# Patient Record
Sex: MALE | Race: ASIAN | Employment: FULL TIME | URBAN - METROPOLITAN AREA
[De-identification: names, ages, dates, MRNs, and addresses within clinical notes are randomized per-mention and may not be internally consistent; named-entity substitution may affect disease eponyms.]

---

## 2018-02-05 ENCOUNTER — TRANSFERRED RECORDS (OUTPATIENT)
Dept: HEALTH INFORMATION MANAGEMENT | Facility: CLINIC | Age: 43
End: 2018-02-05

## 2018-02-05 ENCOUNTER — APPOINTMENT (OUTPATIENT)
Dept: GENERAL RADIOLOGY | Facility: CLINIC | Age: 43
End: 2018-02-05
Attending: EMERGENCY MEDICINE
Payer: COMMERCIAL

## 2018-02-05 ENCOUNTER — HOSPITAL ENCOUNTER (EMERGENCY)
Facility: CLINIC | Age: 43
Discharge: HOME | End: 2018-02-05
Attending: EMERGENCY MEDICINE | Admitting: EMERGENCY MEDICINE
Payer: COMMERCIAL

## 2018-02-05 VITALS
OXYGEN SATURATION: 100 % | TEMPERATURE: 98.9 F | DIASTOLIC BLOOD PRESSURE: 96 MMHG | SYSTOLIC BLOOD PRESSURE: 118 MMHG | HEART RATE: 108 BPM | RESPIRATION RATE: 20 BRPM

## 2018-02-05 DIAGNOSIS — R07.9 CHEST PAIN, UNSPECIFIED TYPE: ICD-10-CM

## 2018-02-05 LAB
ALBUMIN SERPL-MCNC: 3.9 G/DL (ref 3.4–5)
ALP SERPL-CCNC: 123 U/L (ref 40–150)
ALT SERPL W P-5'-P-CCNC: 104 U/L (ref 0–70)
ANION GAP SERPL CALCULATED.3IONS-SCNC: 8 MMOL/L (ref 3–14)
AST SERPL W P-5'-P-CCNC: 53 U/L (ref 0–45)
BASOPHILS # BLD AUTO: 0 10E9/L (ref 0–0.2)
BASOPHILS NFR BLD AUTO: 0.2 %
BILIRUB DIRECT SERPL-MCNC: 0.2 MG/DL (ref 0–0.2)
BILIRUB SERPL-MCNC: 1 MG/DL (ref 0.2–1.3)
BUN SERPL-MCNC: 17 MG/DL (ref 7–30)
CALCIUM SERPL-MCNC: 8.7 MG/DL (ref 8.5–10.1)
CHLORIDE SERPL-SCNC: 102 MMOL/L (ref 94–109)
CO2 SERPL-SCNC: 24 MMOL/L (ref 20–32)
CREAT SERPL-MCNC: 0.77 MG/DL (ref 0.66–1.25)
D DIMER PPP FEU-MCNC: 0.4 UG/ML FEU (ref 0–0.5)
DIFFERENTIAL METHOD BLD: ABNORMAL
EOSINOPHIL # BLD AUTO: 1.1 10E9/L (ref 0–0.7)
EOSINOPHIL NFR BLD AUTO: 8.9 %
ERYTHROCYTE [DISTWIDTH] IN BLOOD BY AUTOMATED COUNT: 13.1 % (ref 10–15)
GFR SERPL CREATININE-BSD FRML MDRD: >90 ML/MIN/1.7M2
GLUCOSE SERPL-MCNC: 129 MG/DL (ref 70–99)
HCT VFR BLD AUTO: 45 % (ref 40–53)
HGB BLD-MCNC: 14.3 G/DL (ref 13.3–17.7)
IMM GRANULOCYTES # BLD: 0 10E9/L (ref 0–0.4)
IMM GRANULOCYTES NFR BLD: 0.3 %
INTERPRETATION ECG - MUSE: NORMAL
LIPASE SERPL-CCNC: 119 U/L (ref 73–393)
LYMPHOCYTES # BLD AUTO: 1.9 10E9/L (ref 0.8–5.3)
LYMPHOCYTES NFR BLD AUTO: 14.7 %
MCH RBC QN AUTO: 24.8 PG (ref 26.5–33)
MCHC RBC AUTO-ENTMCNC: 31.8 G/DL (ref 31.5–36.5)
MCV RBC AUTO: 78 FL (ref 78–100)
MONOCYTES # BLD AUTO: 0.8 10E9/L (ref 0–1.3)
MONOCYTES NFR BLD AUTO: 6.2 %
NEUTROPHILS # BLD AUTO: 8.8 10E9/L (ref 1.6–8.3)
NEUTROPHILS NFR BLD AUTO: 69.7 %
NRBC # BLD AUTO: 0 10*3/UL
NRBC BLD AUTO-RTO: 0 /100
PLATELET # BLD AUTO: 243 10E9/L (ref 150–450)
POTASSIUM SERPL-SCNC: 4.1 MMOL/L (ref 3.4–5.3)
PROT SERPL-MCNC: 8.7 G/DL (ref 6.8–8.8)
RBC # BLD AUTO: 5.77 10E12/L (ref 4.4–5.9)
SODIUM SERPL-SCNC: 134 MMOL/L (ref 133–144)
TROPONIN I SERPL-MCNC: <0.015 UG/L (ref 0–0.04)
WBC # BLD AUTO: 12.6 10E9/L (ref 4–11)

## 2018-02-05 PROCEDURE — 71046 X-RAY EXAM CHEST 2 VIEWS: CPT

## 2018-02-05 PROCEDURE — 80048 BASIC METABOLIC PNL TOTAL CA: CPT | Performed by: EMERGENCY MEDICINE

## 2018-02-05 PROCEDURE — 83690 ASSAY OF LIPASE: CPT | Performed by: EMERGENCY MEDICINE

## 2018-02-05 PROCEDURE — 84484 ASSAY OF TROPONIN QUANT: CPT | Performed by: EMERGENCY MEDICINE

## 2018-02-05 PROCEDURE — 80076 HEPATIC FUNCTION PANEL: CPT | Performed by: EMERGENCY MEDICINE

## 2018-02-05 PROCEDURE — 99285 EMERGENCY DEPT VISIT HI MDM: CPT | Mod: 25

## 2018-02-05 PROCEDURE — 85379 FIBRIN DEGRADATION QUANT: CPT | Performed by: EMERGENCY MEDICINE

## 2018-02-05 PROCEDURE — 93005 ELECTROCARDIOGRAM TRACING: CPT

## 2018-02-05 PROCEDURE — 25000125 ZZHC RX 250: Performed by: EMERGENCY MEDICINE

## 2018-02-05 PROCEDURE — 85025 COMPLETE CBC W/AUTO DIFF WBC: CPT | Performed by: EMERGENCY MEDICINE

## 2018-02-05 PROCEDURE — 25000132 ZZH RX MED GY IP 250 OP 250 PS 637: Performed by: EMERGENCY MEDICINE

## 2018-02-05 RX ADMIN — LIDOCAINE HYDROCHLORIDE 30 ML: 20 SOLUTION ORAL; TOPICAL at 17:54

## 2018-02-05 ASSESSMENT — ENCOUNTER SYMPTOMS
NAUSEA: 1
PALPITATIONS: 1
BACK PAIN: 1
SHORTNESS OF BREATH: 1

## 2018-02-05 NOTE — LETTER
February 5, 2018      To Whom It May Concern:      Vanesa Harmon was seen in our Emergency Department today, 02/05/18.  I expect his condition to improve over the next 1 days.  He may return to work/school when improved.    Sincerely,        Eladio Caro RN

## 2018-02-05 NOTE — ED PROVIDER NOTES
History     Chief Complaint:  Chest Pain    HPI   Vanesa Harmon is a 42 year old male who presents to the emergency department today for evaluation of chest pain and is accompanied by his wife and son.  The patient reports intermittent left and right sided chest pain, sometimes radiating into the back, that has been occurring everyday for several months.  Pain typically lasts for 2-3 hours at a time, seems to worsen with bending and lifting, and occasionally feels as if it is burning.  Today, he reports the onset of this pain at 0400 which is at a 3/10 here and has been constant since that time. He was seen by his PMD, who sent him here.  He also reports nausea here, some shortness of breath, as well as occasional fluttering palpitations over the past several days. Denies history of CAD, hypertension, MI, DVT/PE, or recent surgeries in the past few weeks.  Of note, the patient quit smoking 5 years ago, but began smoking lightly over the past 4 months.  He notes occasional brown mucous when waking up in the morning.    Cardiac Risk Factors:  CAD:                 -  Hypertension :   -  Hyperlipidemia:  +  Diabetes:       +  Tobacco use:     +    Gender:      Male  Age:        42  Familial Hx of CAD:  -    PE/DVT Risk Factors:   Hx of PE/DVT:   -  Hx of clotting disorder:  -  Hx of cancer:    -  Tobacco use:    +  Hormone therapy:   -  Pregnancy:    -  Prolonged immobilization:  -  Recent surgery:   -  Recent travel:    -  Familial Hx of PE/DVT:  -    Allergies:  No Known Drug Allergies    Medications:    Metformin  Atorvastatin  Omeprazole    Past Medical History:    Type 2 diabetes  Hypercholesteremia  Asthma    Past Surgical History:    History reviewed. No pertinent past surgical history.    Family History:    Leukemia-mother, brother  Diabetes-brother  Hypertension-father    Social History:  The patient was accompanied to the ED by his wife and son.  Smoking Status: Light smoker  Smokeless Tobacco: Never  used  Alcohol Use: Yes  Marital Status:   [2]    Review of Systems   Respiratory: Positive for shortness of breath.    Cardiovascular: Positive for chest pain and palpitations. Negative for leg swelling.   Gastrointestinal: Positive for nausea.   Musculoskeletal: Positive for back pain.   All other systems reviewed and are negative.    Physical Exam     Patient Vitals for the past 24 hrs:   BP Temp Temp src Pulse Resp SpO2   02/05/18 1348 (!) 118/96 98.9  F (37.2  C) Oral 108 20 100 %     Physical Exam  Constitutional: The patient is oriented to person, place, and time. Alert and cooperative.  HENT:   Right Ear: External ear normal.   Left Ear: External ear normal.   Nose: Nose normal.   Mouth/Throat: Uvula is midline, oropharynx is clear and moist and mucous membranes are normal. No posterior oropharyngeal edema or erythema.   Eyes: Conjunctivae, EOM and lids are normal. Pupils are equal, round, and reactive to light.   Neck: Trachea normal. Normal range of motion. Neck supple.   Cardiovascular: tachcyardia, regular rhythm, normal heart sounds, and intact distal pulses.    Pulmonary/Chest: Effort normal and breath sounds equal bilaterally. No crackles or wheezing.   Abdominal: Soft. No tenderness. No rebound and no guarding.   Musculoskeletal: Normal range of motion.  No extremity tenderness or edema.  Neurological: Alert and Oriented. Strength 5/5 in upper and lower extremities bilaterally. Sensation intact to light touch throughout.  Skin: Skin is dry. No rash noted.          Emergency Department Course     ECG:  ECG taken at 1337, ECG read at 1748  Sinus tachycardia  Otherwise normal ECG  Rate 107 bpm. IA interval 164. QRS duration 80. QT/QTc 330/440. P-R-T axes 61 28 39.    Imaging:  Radiology findings were communicated with the patient and family who voiced understanding of the findings.    Chest XR, PA & LAT  Cardiac silhouette and pulmonary vasculature are within  normal limits. No focal airspace  disease, pleural effusion or  Pneumothorax.  Reading per radiology    Laboratory:  Laboratory findings were communicated with the patient and family who voiced understanding of the findings.    CBC: WBC 12.6 (H), HGB 14.3,    BMP: Glucose 129 (H) o/w WNL (Creatinine 0.77)  Hepatic Panel:  (H), AST 53 (H) o/w WNL  Lipase: 119  Troponin (Collected 1457): <0.015  D Dimer (Collected 1457): 0.4    Interventions:  1754 GI cocktail 30 mL PO    Emergency Department Course:  Nursing notes and vitals reviewed.  1738: I performed an exam of the patient as documented above.   IV was inserted and blood was drawn for laboratory testing, results above.  The patient was sent for a chest x-ray, PA and LAT, while in the emergency department, results above.   1835: I rechecked the patient and updated him and his family on the results of laboratory and imaging studies. I discussed the treatment plan with the patient. He expressed understanding of this plan and consented to discharge. He will be discharged home with instructions for care and follow up. In addition, the patient will return to the emergency department if their symptoms worsen, if new symptoms arise or if there is any concern.  All questions were answered prior to discharge.    Impression & Plan      Medical Decision Making:  Vanesa Harmon is a 42 year old male with history of diabetes and hyperlipidemia who presents to the emergency department for evaluation of chest pain.  Upon presentation in the ED, the patient is nontoxic appearing.  He is hypertensive and mildly tachycardic, but vital signs are otherwise within normal limits and stable.  On exam, he is well-appearing.  He is alert, oriented, and neurologic exam is nonfocal.  Cardiopulmonary exam is unremarkable.  Abdomen is soft and nontender throughout.  He has no lower extremity edema.  The rest of his exam is as mentioned above.  Differential includes, but is not limited to, ACS, PE, pneumonia,  pneumothorax, aortic dissection, esophageal rupture, GERD, pericarditis, musculoskeletal chest wall pain.  EKG was obtained and demonstrates sinus rhythm.  There are no concerning acute ischemic changes.  Labs were obtained and are as mentioned above.  Notably, his LFTs are mildly elevated.  However, the patient  has had mildly elevated LFTs in the past.  He has no abdominal tenderness on exam, therefore I have low suspicion for an acute hepatic pathology or acute surgical abdomen.  I do not feel that imaging of the abdomen is indicated at this time.  I did discuss with the patient that I recommend close follow-up with his primary care physician regarding these.  Chest x-ray was obtained and demonstrates no evidence of an acute cardiopulmonary process.  Given the unremarkable chest x-ray, I have low suspicion for pneumonia, pneumothorax.  His history and presentation are not consistent with aortic dissection, esophageal rupture, or pericarditis.  Therefore, I feel these diagnoses are less likely.  The patient is low risk for PE by well's criteria and with a negative d-dimer, PE is less likely and further evaluation for this is not indicated at this time.  Given the unremarkable EKG and negative troponin despite greater than 6 hours of symptoms, this essentially rules out ACS.  The patient is low risk for ACS by heart score.  On upon my repeat evaluation, after the patient received a GI cocktail, he does note improvement of his symptoms.  Given that he is well-appearing and with an unremarkable work up, I do feel that he is safe for discharge home.  I did discuss with the patient that I recommend close follow-up with his primary care physician with an outpatient stress test.  He notes understanding and agreement with this plan.  He will be initiated on an antacid.  Return instructions were given.  He was stable/improved at time of discharge.    Diagnosis:    ICD-10-CM    1. Chest pain, unspecified type R07.9 Exercise  Stress Echocardiogram     Disposition:   Home    Discharge Medications:  Discharge Medication List as of 2/5/2018  7:07 PM      START taking these medications    Details   ranitidine (ZANTAC) 150 MG tablet Take 1 tablet (150 mg) by mouth 2 times daily for 10 days, Disp-20 tablet, R-0, Local Print           Scribe Disclosure:  I, Solis Barrow, am serving as a scribe at 5:38 PM on 2/5/2018 to document services personally performed by Avis Franklin MD, based on my observations and the provider's statements to me.    2/5/2018   [facility] EMERGENCY DEPARTMENT       Avis Franklin MD  02/07/18 1600

## 2018-02-05 NOTE — ED AVS SNAPSHOT
[facility] Emergency Department    [address]    [facility] [address]    Phone:  [phone]    Fax:  [phone]                                       Vanesa Harmon   MRN: [id number]    Department:  [facility] Emergency Department   Date of Visit:  2/5/2018           Patient Information     Date Of Birth          1975        Your diagnoses for this visit were:     Chest pain, unspecified type        You were seen by Avis Franklin MD.      Follow-up Information     Follow up with Gurjit Marie In 3 days.    Specialty:  Family Practice    Contact information:    [facility]  [address]  [facility] [address]  [phone]          Discharge Instructions       Please follow up closely with your regular physician and the outpatient stress test.     Please return to the ED if your symptoms worsen or if you develop new or concerning symptoms.     Discharge Instructions  Chest Pain    You have been seen today for chest pain or discomfort.  At this time, your doctor has found no signs that your chest pain is due to a serious or life-threatening condition, (or you have declined more testing and/or admission to the hospital). However, sometimes there is a serious problem that does not show up right away. Your evaluation today may not be complete and you may need further testing and evaluation.     You need to follow-up with your regular doctor within 3 days.    Return to the Emergency Department if:    Your chest pain changes, gets worse, starts to happen more often, or comes with less activity.    You are short of breath.    You get very weak or tired.    You pass out or faint.    You have any new symptoms, like fever, cough, numb legs, or you cough up blood.    You have anything else that worries you.    Until you follow-up with your regular doctor please do the following:    Take one aspirin daily unless you have an allergy or are told not to by your  doctor.    If a stress test appointment has been made, go to the appointment.    If you have questions, contact your regular doctor.    If your doctor today has told you to follow-up with your regular doctor, it is very important that you make an appointment with your clinic and go to the appointment.  If you do not follow-up with your primary doctor, it may result in missing an important development which could result in permanent injury or disability and/or lasting pain.  If there is any problem keeping your appointment, call your doctor or return to the Emergency Department.    If you were given a prescription for medicine here today, be sure to read all of the information (including the package insert) that comes with your prescription.  This will include important information about the medicine, its side effects, and any warnings that you need to know about.  The pharmacist who fills the prescription can provide more information and answer questions you may have about the medicine.  If you have questions or concerns that the pharmacist cannot address, please call or return to the Emergency Department.     Opioid Medication Information    Pain medications are among the most commonly prescribed medicines, so we are including this information for all our patients. If you did not receive pain medication or get a prescription for pain medicine, you can ignore it.     You may have been given a prescription for an opioid (narcotic) pain medicine and/or have received a pain medicine while here in the Emergency Department. These medicines can make you drowsy or impaired. You must not drive, operate dangerous equipment, or engage in any other dangerous activities while taking these medications. If you drive while taking these medications, you could be arrested for DUI, or driving under the influence. Do not drink any alcohol while you are taking these medications.     Opioid pain medications can cause addiction. If you  have a history of chemical dependency of any type, you are at a higher risk of becoming addicted to pain medications.  Only take these prescribed medications to treat your pain when all other options have been tried. Take it for as short a time and as few doses as possible. Store your pain pills in a secure place, as they are frequently stolen and provide a dangerous opportunity for children or visitors in your house to start abusing these powerful medications. We will not replace any lost or stolen medicine.  As soon as your pain is better, you should flush all your remaining medication.     Many prescription pain medications contain Tylenol  (acetaminophen), including Vicodin , Tylenol #3 , Norco , Lortab , and Percocet .  You should not take any extra pills of Tylenol  if you are using these prescription medications or you can get very sick.  Do not ever take more than 3000 mg of acetaminophen in any 24 hour period.    All opioids tend to cause constipation. Drink plenty of water and eat foods that have a lot of fiber, such as fruits, vegetables, prune juice, apple juice and high fiber cereal.  Take a laxative if you don t move your bowels at least every other day. Miralax , Milk of Magnesia, Colace , or Senna  can be used to keep you regular.      Remember that you can always come back to the Emergency Department if you are not able to see your regular doctor in the amount of time listed above, if you get any new symptoms, or if there is anything that worries you.          24 Hour Appointment Hotline       To make an appointment at any [facility], call [phone] ([phone]). If you don't have a family doctor or clinic, we will help you find one. [facility] clinics are conveniently located to serve the needs of you and your family.          ED Discharge Orders     Exercise Stress Echocardiogram       Administration of IV contrast will be tailored to this examination per the appropriate written  protocol listed in the Echocardiography department Protocol Book, or by the supervising Cardiologist. This may result in an order change.    Use of contrast is at the discretion of the supervising Cardiologist.                     Review of your medicines      START taking        Dose / Directions Last dose taken    ranitidine 150 MG tablet   Commonly known as:  ZANTAC   Dose:  150 mg   Quantity:  20 tablet        Take 1 tablet (150 mg) by mouth 2 times daily for 10 days   Refills:  0          Our records show that you are taking the medicines listed below. If these are incorrect, please call your family doctor or clinic.        Dose / Directions Last dose taken    NO ACTIVE MEDICATIONS        Refills:  0                Prescriptions were sent or printed at these locations (1 Prescription)                   Other Prescriptions                Printed at Department/Unit printer (1 of 1)         ranitidine (ZANTAC) 150 MG tablet                Procedures and tests performed during your visit     Basic metabolic panel    CBC with platelets differential    Chest XR,  PA & LAT    D dimer quantitative    EKG 12 lead    Hepatic panel    Lipase    Troponin I      Orders Needing Specimen Collection     None      Pending Results     No orders found from 2/3/2018 to 2/6/2018.            Pending Culture Results     No orders found from 2/3/2018 to 2/6/2018.            Pending Results Instructions     If you had any lab results that were not finalized at the time of your Discharge, you can call the ED Lab Result RN at [phone]. You will be contacted by this team for any positive Lab results or changes in treatment. The nurses are available 7 days a week from 10A to 6:30P.  You can leave a message 24 hours per day and they will return your call.        Test Results From Your Hospital Stay        2/5/2018  3:32 PM      Component Results     Component Value Ref Range & Units Status    Sodium 134 133 - 144 mmol/L Final     Potassium 4.1 3.4 - 5.3 mmol/L Final    Chloride 102 94 - 109 mmol/L Final    Carbon Dioxide 24 20 - 32 mmol/L Final    Anion Gap 8 3 - 14 mmol/L Final    Glucose 129 (H) 70 - 99 mg/dL Final    Urea Nitrogen 17 7 - 30 mg/dL Final    Creatinine 0.77 0.66 - 1.25 mg/dL Final    GFR Estimate >90 >60 mL/min/1.7m2 Final    Non  GFR Calc    GFR Estimate If Black >90 >60 mL/min/1.7m2 Final    African American GFR Calc    Calcium 8.7 8.5 - 10.1 mg/dL Final         2/5/2018  3:12 PM      Component Results     Component Value Ref Range & Units Status    WBC 12.6 (H) 4.0 - 11.0 10e9/L Final    RBC Count 5.77 4.4 - 5.9 10e12/L Final    Hemoglobin 14.3 13.3 - 17.7 g/dL Final    Hematocrit 45.0 40.0 - 53.0 % Final    MCV 78 78 - 100 fl Final    MCH 24.8 (L) 26.5 - 33.0 pg Final    MCHC 31.8 31.5 - 36.5 g/dL Final    RDW 13.1 10.0 - 15.0 % Final    Platelet Count 243 150 - 450 10e9/L Final    Diff Method Automated Method  Final    % Neutrophils 69.7 % Final    % Lymphocytes 14.7 % Final    % Monocytes 6.2 % Final    % Eosinophils 8.9 % Final    % Basophils 0.2 % Final    % Immature Granulocytes 0.3 % Final    Nucleated RBCs 0 0 /100 Final    Absolute Neutrophil 8.8 (H) 1.6 - 8.3 10e9/L Final    Absolute Lymphocytes 1.9 0.8 - 5.3 10e9/L Final    Absolute Monocytes 0.8 0.0 - 1.3 10e9/L Final    Absolute Eosinophils 1.1 (H) 0.0 - 0.7 10e9/L Final    Absolute Basophils 0.0 0.0 - 0.2 10e9/L Final    Abs Immature Granulocytes 0.0 0 - 0.4 10e9/L Final    Absolute Nucleated RBC 0.0  Final         2/5/2018  3:32 PM      Component Results     Component Value Ref Range & Units Status    Troponin I ES <0.015 0.000 - 0.045 ug/L Final    The 99th percentile for upper reference range is 0.045 ug/L.  Troponin values   in the range of 0.045 - 0.120 ug/L may be associated with risks of adverse   clinical events.           2/5/2018  3:35 PM      Narrative     XR CHEST 2 VW 2/5/2018 3:31 PM    COMPARISON: None.    HISTORY: Chest  pain.        Impression     IMPRESSION: Cardiac silhouette and pulmonary vasculature are within  normal limits. No focal airspace disease, pleural effusion or  pneumothorax.    GABE GARCIA MD         2/5/2018  6:11 PM      Component Results     Component Value Ref Range & Units Status    Bilirubin Direct 0.2 0.0 - 0.2 mg/dL Final    Bilirubin Total 1.0 0.2 - 1.3 mg/dL Final    Albumin 3.9 3.4 - 5.0 g/dL Final    Protein Total 8.7 6.8 - 8.8 g/dL Final    Alkaline Phosphatase 123 40 - 150 U/L Final     (H) 0 - 70 U/L Final    AST 53 (H) 0 - 45 U/L Final         2/5/2018  6:05 PM      Component Results     Component Value Ref Range & Units Status    D Dimer 0.4 0.0 - 0.50 ug/ml FEU Final    This D-dimer assay is intended for use in conjunction with a clinical pretest   probability assessment model to exclude pulmonary embolism (PE) and deep   venous thrombosis (DVT) in outpatients suspected of PE or DVT. The cut-off   value is 0.5 ug/mL FEU.           2/5/2018  6:19 PM      Component Results     Component Value Ref Range & Units Status    Lipase 119 73 - 393 U/L Final                Clinical Quality Measure: Blood Pressure Screening     Your blood pressure was checked while you were in the emergency department today. The last reading we obtained was  BP: (!) 118/96 . Please read the guidelines below about what these numbers mean and what you should do about them.  If your systolic blood pressure (the top number) is less than 120 and your diastolic blood pressure (the bottom number) is less than 80, then your blood pressure is normal. There is nothing more that you need to do about it.  If your systolic blood pressure (the top number) is 120-139 or your diastolic blood pressure (the bottom number) is 80-89, your blood pressure may be higher than it should be. You should have your blood pressure rechecked within a year by a primary care provider.  If your systolic blood pressure (the top number) is 140 or  "greater or your diastolic blood pressure (the bottom number) is 90 or greater, you may have high blood pressure. High blood pressure is treatable, but if left untreated over time it can put you at risk for heart attack, stroke, or kidney failure. You should have your blood pressure rechecked by a primary care provider within the next 4 weeks.  If your provider in the emergency department today gave you specific instructions to follow-up with your doctor or provider even sooner than that, you should follow that instruction and not wait for up to 4 weeks for your follow-up visit.        Thank you for choosing [facility]       Thank you for choosing [facility] for your care. Our goal is always to provide you with excellent care. Hearing back from our patients is one way we can continue to improve our services. Please take a few minutes to complete the written survey that you may receive in the mail after you visit with us. Thank you!        [employer] Information     [email or web address] lets you send messages to your doctor, view your test results, renew your prescriptions, schedule appointments and more. To sign up, go to [email or web address] . Click on \"Log in\" on the left side of the screen, which will take you to the Welcome page. Then click on \"Sign up Now\" on the right side of the page.     You will be asked to enter the access code listed below, as well as some personal information. Please follow the directions to create your username and password.     Your access code is: [email or web address]  Expires: 2018  7:07 PM     Your access code will  in 90 days. If you need help or a new code, please call your [facility] clinic or [phone].        Care EveryWhere ID     This is your Care EveryWhere ID. This could be used by other organizations to access your [facility] medical records  [id number]        Equal Access to Services     RITU [place] AH: amy Ramos qaybta kaalmada adeegyada, waxay " quang leslie ah. So [facility] [phone].    ATENCIÓN: Si habla español, tiene a stone disposición servicios gratuitos de asistencia lingüística. Llame al [phone].    We comply with applicable federal civil rights laws and [place] laws. We do not discriminate on the basis of race, color, national origin, age, disability, sex, sexual orientation, or gender identity.            After Visit Summary       This is your record. Keep this with you and show to your community pharmacist(s) and doctor(s) at your next visit.

## 2018-02-05 NOTE — ED NOTES
ABC's intact.  Alert and oriented x4.    Pt c/o intermittent chest pain for a couple weeks.  Today went to PMD for n/v/d who sent pt to ED for chest pain.  Currently states chest pain is mostly gone.  C/o some shortness of breath and headache.

## 2018-02-05 NOTE — ED AVS SNAPSHOT
[facility] Emergency Department    [address]    [facility] [address]    Phone:  [phone]    Fax:  [phone]                                       Vanesa Harmon   MRN: [id number]    Department:  [facility] Emergency Department   Date of Visit:  2/5/2018           After Visit Summary Signature Page     I have received my discharge instructions, and my questions have been answered. I have discussed any challenges I see with this plan with the nurse or doctor.    ..........................................................................................................................................  Patient/Patient Representative Signature      ..........................................................................................................................................  Patient Representative Print Name and Relationship to Patient    ..................................................               ................................................  Date                                            Time    ..........................................................................................................................................  Reviewed by Signature/Title    ...................................................              ..............................................  Date                                                            Time

## 2018-02-06 NOTE — DISCHARGE INSTRUCTIONS
Please follow up closely with your regular physician and the outpatient stress test.     Please return to the ED if your symptoms worsen or if you develop new or concerning symptoms.     Discharge Instructions  Chest Pain    You have been seen today for chest pain or discomfort.  At this time, your doctor has found no signs that your chest pain is due to a serious or life-threatening condition, (or you have declined more testing and/or admission to the hospital). However, sometimes there is a serious problem that does not show up right away. Your evaluation today may not be complete and you may need further testing and evaluation.     You need to follow-up with your regular doctor within 3 days.    Return to the Emergency Department if:    Your chest pain changes, gets worse, starts to happen more often, or comes with less activity.    You are short of breath.    You get very weak or tired.    You pass out or faint.    You have any new symptoms, like fever, cough, numb legs, or you cough up blood.    You have anything else that worries you.    Until you follow-up with your regular doctor please do the following:    Take one aspirin daily unless you have an allergy or are told not to by your doctor.    If a stress test appointment has been made, go to the appointment.    If you have questions, contact your regular doctor.    If your doctor today has told you to follow-up with your regular doctor, it is very important that you make an appointment with your clinic and go to the appointment.  If you do not follow-up with your primary doctor, it may result in missing an important development which could result in permanent injury or disability and/or lasting pain.  If there is any problem keeping your appointment, call your doctor or return to the Emergency Department.    If you were given a prescription for medicine here today, be sure to read all of the information (including the package insert) that comes with your  prescription.  This will include important information about the medicine, its side effects, and any warnings that you need to know about.  The pharmacist who fills the prescription can provide more information and answer questions you may have about the medicine.  If you have questions or concerns that the pharmacist cannot address, please call or return to the Emergency Department.     Opioid Medication Information    Pain medications are among the most commonly prescribed medicines, so we are including this information for all our patients. If you did not receive pain medication or get a prescription for pain medicine, you can ignore it.     You may have been given a prescription for an opioid (narcotic) pain medicine and/or have received a pain medicine while here in the Emergency Department. These medicines can make you drowsy or impaired. You must not drive, operate dangerous equipment, or engage in any other dangerous activities while taking these medications. If you drive while taking these medications, you could be arrested for DUI, or driving under the influence. Do not drink any alcohol while you are taking these medications.     Opioid pain medications can cause addiction. If you have a history of chemical dependency of any type, you are at a higher risk of becoming addicted to pain medications.  Only take these prescribed medications to treat your pain when all other options have been tried. Take it for as short a time and as few doses as possible. Store your pain pills in a secure place, as they are frequently stolen and provide a dangerous opportunity for children or visitors in your house to start abusing these powerful medications. We will not replace any lost or stolen medicine.  As soon as your pain is better, you should flush all your remaining medication.     Many prescription pain medications contain Tylenol  (acetaminophen), including Vicodin , Tylenol #3 , Norco , Lortab , and Percocet .  You  should not take any extra pills of Tylenol  if you are using these prescription medications or you can get very sick.  Do not ever take more than 3000 mg of acetaminophen in any 24 hour period.    All opioids tend to cause constipation. Drink plenty of water and eat foods that have a lot of fiber, such as fruits, vegetables, prune juice, apple juice and high fiber cereal.  Take a laxative if you don t move your bowels at least every other day. Miralax , Milk of Magnesia, Colace , or Senna  can be used to keep you regular.      Remember that you can always come back to the Emergency Department if you are not able to see your regular doctor in the amount of time listed above, if you get any new symptoms, or if there is anything that worries you.

## 2019-12-05 ENCOUNTER — HOSPITAL ENCOUNTER (EMERGENCY)
Facility: CLINIC | Age: 44
Discharge: ANOTHER HOSPITAL | End: 2019-12-06
Attending: EMERGENCY MEDICINE | Admitting: EMERGENCY MEDICINE
Payer: COMMERCIAL

## 2019-12-05 ENCOUNTER — APPOINTMENT (OUTPATIENT)
Dept: CT IMAGING | Facility: CLINIC | Age: 44
End: 2019-12-05
Attending: EMERGENCY MEDICINE
Payer: COMMERCIAL

## 2019-12-05 DIAGNOSIS — I60.9 SAH (SUBARACHNOID HEMORRHAGE) (H): ICD-10-CM

## 2019-12-05 LAB
ANION GAP SERPL CALCULATED.3IONS-SCNC: 7 MMOL/L (ref 3–14)
APTT PPP: 28 SEC (ref 22–37)
BUN SERPL-MCNC: 14 MG/DL (ref 7–30)
CALCIUM SERPL-MCNC: 8.8 MG/DL (ref 8.5–10.1)
CHLORIDE SERPL-SCNC: 101 MMOL/L (ref 94–109)
CO2 SERPL-SCNC: 27 MMOL/L (ref 20–32)
CREAT BLD-MCNC: 0.7 MG/DL (ref 0.66–1.25)
CREAT SERPL-MCNC: 0.68 MG/DL (ref 0.66–1.25)
ERYTHROCYTE [DISTWIDTH] IN BLOOD BY AUTOMATED COUNT: 14.3 % (ref 10–15)
GFR SERPL CREATININE-BSD FRML MDRD: >90 ML/MIN/{1.73_M2}
GFR SERPL CREATININE-BSD FRML MDRD: >90 ML/MIN/{1.73_M2}
GLUCOSE SERPL-MCNC: 286 MG/DL (ref 70–99)
HCT VFR BLD AUTO: 41.5 % (ref 40–53)
HGB BLD-MCNC: 12.8 G/DL (ref 13.3–17.7)
INR PPP: 0.95 (ref 0.86–1.14)
MCH RBC QN AUTO: 23.6 PG (ref 26.5–33)
MCHC RBC AUTO-ENTMCNC: 30.8 G/DL (ref 31.5–36.5)
MCV RBC AUTO: 77 FL (ref 78–100)
PLATELET # BLD AUTO: 223 10E9/L (ref 150–450)
POTASSIUM SERPL-SCNC: 3.7 MMOL/L (ref 3.4–5.3)
RBC # BLD AUTO: 5.42 10E12/L (ref 4.4–5.9)
SODIUM SERPL-SCNC: 135 MMOL/L (ref 133–144)
WBC # BLD AUTO: 12.4 10E9/L (ref 4–11)

## 2019-12-05 PROCEDURE — 96365 THER/PROPH/DIAG IV INF INIT: CPT | Mod: 59

## 2019-12-05 PROCEDURE — 85027 COMPLETE CBC AUTOMATED: CPT | Performed by: EMERGENCY MEDICINE

## 2019-12-05 PROCEDURE — 80048 BASIC METABOLIC PNL TOTAL CA: CPT | Performed by: EMERGENCY MEDICINE

## 2019-12-05 PROCEDURE — 96375 TX/PRO/DX INJ NEW DRUG ADDON: CPT

## 2019-12-05 PROCEDURE — 86901 BLOOD TYPING SEROLOGIC RH(D): CPT | Performed by: EMERGENCY MEDICINE

## 2019-12-05 PROCEDURE — 25000128 H RX IP 250 OP 636: Performed by: EMERGENCY MEDICINE

## 2019-12-05 PROCEDURE — 99285 EMERGENCY DEPT VISIT HI MDM: CPT | Mod: 25

## 2019-12-05 PROCEDURE — 25000125 ZZHC RX 250: Performed by: EMERGENCY MEDICINE

## 2019-12-05 PROCEDURE — 70450 CT HEAD/BRAIN W/O DYE: CPT

## 2019-12-05 PROCEDURE — 96376 TX/PRO/DX INJ SAME DRUG ADON: CPT

## 2019-12-05 PROCEDURE — 85730 THROMBOPLASTIN TIME PARTIAL: CPT | Performed by: EMERGENCY MEDICINE

## 2019-12-05 PROCEDURE — 25000128 H RX IP 250 OP 636

## 2019-12-05 PROCEDURE — 86900 BLOOD TYPING SEROLOGIC ABO: CPT | Performed by: EMERGENCY MEDICINE

## 2019-12-05 PROCEDURE — 86850 RBC ANTIBODY SCREEN: CPT | Performed by: EMERGENCY MEDICINE

## 2019-12-05 PROCEDURE — 85610 PROTHROMBIN TIME: CPT | Performed by: EMERGENCY MEDICINE

## 2019-12-05 PROCEDURE — 82565 ASSAY OF CREATININE: CPT | Mod: 91

## 2019-12-05 PROCEDURE — 70498 CT ANGIOGRAPHY NECK: CPT

## 2019-12-05 RX ORDER — MORPHINE SULFATE 4 MG/ML
4 INJECTION, SOLUTION INTRAMUSCULAR; INTRAVENOUS
Status: DISCONTINUED | OUTPATIENT
Start: 2019-12-05 | End: 2019-12-06 | Stop reason: HOSPADM

## 2019-12-05 RX ORDER — ONDANSETRON 2 MG/ML
4 INJECTION INTRAMUSCULAR; INTRAVENOUS ONCE
Status: COMPLETED | OUTPATIENT
Start: 2019-12-05 | End: 2019-12-05

## 2019-12-05 RX ORDER — IOPAMIDOL 755 MG/ML
500 INJECTION, SOLUTION INTRAVASCULAR ONCE
Status: COMPLETED | OUTPATIENT
Start: 2019-12-05 | End: 2019-12-05

## 2019-12-05 RX ORDER — ONDANSETRON 2 MG/ML
INJECTION INTRAMUSCULAR; INTRAVENOUS
Status: COMPLETED
Start: 2019-12-05 | End: 2019-12-05

## 2019-12-05 RX ADMIN — MORPHINE SULFATE 4 MG: 4 INJECTION INTRAVENOUS at 23:18

## 2019-12-05 RX ADMIN — IOPAMIDOL 70 ML: 755 INJECTION, SOLUTION INTRAVENOUS at 23:47

## 2019-12-05 RX ADMIN — SODIUM CHLORIDE 80 ML: 9 INJECTION, SOLUTION INTRAVENOUS at 23:47

## 2019-12-05 RX ADMIN — ONDANSETRON HYDROCHLORIDE 4 MG: 2 INJECTION, SOLUTION INTRAMUSCULAR; INTRAVENOUS at 22:42

## 2019-12-05 RX ADMIN — ONDANSETRON 4 MG: 2 INJECTION INTRAMUSCULAR; INTRAVENOUS at 22:42

## 2019-12-05 ASSESSMENT — ENCOUNTER SYMPTOMS
NAUSEA: 1
SPEECH DIFFICULTY: 0
VOMITING: 1
PHOTOPHOBIA: 1
FEVER: 0
HEADACHES: 1

## 2019-12-06 ENCOUNTER — APPOINTMENT (OUTPATIENT)
Dept: SPEECH THERAPY | Facility: CLINIC | Age: 44
DRG: 066 | End: 2019-12-06
Payer: COMMERCIAL

## 2019-12-06 ENCOUNTER — HOSPITAL ENCOUNTER (INPATIENT)
Facility: CLINIC | Age: 44
LOS: 7 days | Discharge: HOME | DRG: 066 | End: 2019-12-13
Attending: EMERGENCY MEDICINE | Admitting: NEUROLOGICAL SURGERY
Payer: COMMERCIAL

## 2019-12-06 ENCOUNTER — APPOINTMENT (OUTPATIENT)
Dept: MRI IMAGING | Facility: CLINIC | Age: 44
DRG: 066 | End: 2019-12-06
Attending: STUDENT IN AN ORGANIZED HEALTH CARE EDUCATION/TRAINING PROGRAM
Payer: COMMERCIAL

## 2019-12-06 ENCOUNTER — APPOINTMENT (OUTPATIENT)
Dept: CARDIOLOGY | Facility: CLINIC | Age: 44
DRG: 066 | End: 2019-12-06
Payer: COMMERCIAL

## 2019-12-06 ENCOUNTER — APPOINTMENT (OUTPATIENT)
Dept: INTERVENTIONAL RADIOLOGY/VASCULAR | Facility: CLINIC | Age: 44
DRG: 066 | End: 2019-12-06
Payer: COMMERCIAL

## 2019-12-06 ENCOUNTER — APPOINTMENT (OUTPATIENT)
Dept: ULTRASOUND IMAGING | Facility: CLINIC | Age: 44
DRG: 066 | End: 2019-12-06
Payer: COMMERCIAL

## 2019-12-06 ENCOUNTER — APPOINTMENT (OUTPATIENT)
Dept: CT IMAGING | Facility: CLINIC | Age: 44
DRG: 066 | End: 2019-12-06
Payer: COMMERCIAL

## 2019-12-06 ENCOUNTER — APPOINTMENT (OUTPATIENT)
Dept: PHYSICAL THERAPY | Facility: CLINIC | Age: 44
DRG: 066 | End: 2019-12-06
Payer: COMMERCIAL

## 2019-12-06 VITALS
SYSTOLIC BLOOD PRESSURE: 138 MMHG | DIASTOLIC BLOOD PRESSURE: 85 MMHG | RESPIRATION RATE: 18 BRPM | TEMPERATURE: 97.1 F | OXYGEN SATURATION: 100 % | HEART RATE: 100 BPM

## 2019-12-06 DIAGNOSIS — I60.9 SUBARACHNOID HEMORRHAGE (H): Primary | ICD-10-CM

## 2019-12-06 DIAGNOSIS — I60.9: ICD-10-CM

## 2019-12-06 LAB
ABO + RH BLD: NORMAL
ANION GAP SERPL CALCULATED.3IONS-SCNC: 9 MMOL/L (ref 3–14)
APTT PPP: 30 SEC (ref 22–37)
BLD GP AB SCN SERPL QL: NORMAL
BLD GP AB SCN SERPL QL: NORMAL
BLOOD BANK CMNT PATIENT-IMP: NORMAL
BLOOD BANK CMNT PATIENT-IMP: NORMAL
BUN SERPL-MCNC: 10 MG/DL (ref 7–30)
CALCIUM SERPL-MCNC: 8.7 MG/DL (ref 8.5–10.1)
CHLORIDE SERPL-SCNC: 103 MMOL/L (ref 94–109)
CO2 SERPL-SCNC: 24 MMOL/L (ref 20–32)
CREAT SERPL-MCNC: 0.6 MG/DL (ref 0.66–1.25)
ERYTHROCYTE [DISTWIDTH] IN BLOOD BY AUTOMATED COUNT: 14.5 % (ref 10–15)
FIBRINOGEN PPP-MCNC: 266 MG/DL (ref 200–420)
GFR SERPL CREATININE-BSD FRML MDRD: >90 ML/MIN/{1.73_M2}
GLUCOSE BLDC GLUCOMTR-MCNC: 204 MG/DL (ref 70–99)
GLUCOSE BLDC GLUCOMTR-MCNC: 213 MG/DL (ref 70–99)
GLUCOSE BLDC GLUCOMTR-MCNC: 220 MG/DL (ref 70–99)
GLUCOSE BLDC GLUCOMTR-MCNC: 242 MG/DL (ref 70–99)
GLUCOSE BLDC GLUCOMTR-MCNC: 276 MG/DL (ref 70–99)
GLUCOSE BLDC GLUCOMTR-MCNC: 292 MG/DL (ref 70–99)
GLUCOSE BLDC GLUCOMTR-MCNC: 304 MG/DL (ref 70–99)
GLUCOSE BLDC GLUCOMTR-MCNC: 319 MG/DL (ref 70–99)
GLUCOSE SERPL-MCNC: 259 MG/DL (ref 70–99)
HBA1C MFR BLD: 11.5 % (ref 0–5.6)
HCT VFR BLD AUTO: 42.6 % (ref 40–53)
HGB BLD-MCNC: 13 G/DL (ref 13.3–17.7)
INR PPP: 1.15 (ref 0.86–1.14)
INTERPRETATION ECG - MUSE: NORMAL
MCH RBC QN AUTO: 23.3 PG (ref 26.5–33)
MCHC RBC AUTO-ENTMCNC: 30.5 G/DL (ref 31.5–36.5)
MCV RBC AUTO: 76 FL (ref 78–100)
MRSA DNA SPEC QL NAA+PROBE: NEGATIVE
PLATELET # BLD AUTO: 229 10E9/L (ref 150–450)
POTASSIUM SERPL-SCNC: 4 MMOL/L (ref 3.4–5.3)
RBC # BLD AUTO: 5.58 10E12/L (ref 4.4–5.9)
SODIUM SERPL-SCNC: 136 MMOL/L (ref 133–144)
SPECIMEN EXP DATE BLD: NORMAL
SPECIMEN EXP DATE BLD: NORMAL
SPECIMEN SOURCE: NORMAL
WBC # BLD AUTO: 13.4 10E9/L (ref 4–11)

## 2019-12-06 PROCEDURE — 92610 EVALUATE SWALLOWING FUNCTION: CPT | Mod: GN

## 2019-12-06 PROCEDURE — 27210804 ZZH SHEATH CR3

## 2019-12-06 PROCEDURE — 72156 MRI NECK SPINE W/O & W/DYE: CPT

## 2019-12-06 PROCEDURE — 85730 THROMBOPLASTIN TIME PARTIAL: CPT | Performed by: STUDENT IN AN ORGANIZED HEALTH CARE EDUCATION/TRAINING PROGRAM

## 2019-12-06 PROCEDURE — 83036 HEMOGLOBIN GLYCOSYLATED A1C: CPT | Performed by: STUDENT IN AN ORGANIZED HEALTH CARE EDUCATION/TRAINING PROGRAM

## 2019-12-06 PROCEDURE — 27210889 ZZH ACCESSORY CR8

## 2019-12-06 PROCEDURE — 25000128 H RX IP 250 OP 636: Performed by: EMERGENCY MEDICINE

## 2019-12-06 PROCEDURE — 85610 PROTHROMBIN TIME: CPT | Performed by: STUDENT IN AN ORGANIZED HEALTH CARE EDUCATION/TRAINING PROGRAM

## 2019-12-06 PROCEDURE — 83036 HEMOGLOBIN GLYCOSYLATED A1C: CPT | Performed by: NEUROLOGICAL SURGERY

## 2019-12-06 PROCEDURE — 20000004 ZZH R&B ICU UMMC

## 2019-12-06 PROCEDURE — 97161 PT EVAL LOW COMPLEX 20 MIN: CPT | Mod: GP | Performed by: PHYSICAL THERAPIST

## 2019-12-06 PROCEDURE — 80048 BASIC METABOLIC PNL TOTAL CA: CPT | Performed by: STUDENT IN AN ORGANIZED HEALTH CARE EDUCATION/TRAINING PROGRAM

## 2019-12-06 PROCEDURE — 25000128 H RX IP 250 OP 636: Performed by: STUDENT IN AN ORGANIZED HEALTH CARE EDUCATION/TRAINING PROGRAM

## 2019-12-06 PROCEDURE — 25000132 ZZH RX MED GY IP 250 OP 250 PS 637: Performed by: NEUROLOGICAL SURGERY

## 2019-12-06 PROCEDURE — 99153 MOD SED SAME PHYS/QHP EA: CPT

## 2019-12-06 PROCEDURE — 93886 INTRACRANIAL COMPLETE STUDY: CPT

## 2019-12-06 PROCEDURE — 93306 TTE W/DOPPLER COMPLETE: CPT | Mod: 26 | Performed by: INTERNAL MEDICINE

## 2019-12-06 PROCEDURE — 25800030 ZZH RX IP 258 OP 636: Performed by: STUDENT IN AN ORGANIZED HEALTH CARE EDUCATION/TRAINING PROGRAM

## 2019-12-06 PROCEDURE — 86900 BLOOD TYPING SEROLOGIC ABO: CPT | Performed by: STUDENT IN AN ORGANIZED HEALTH CARE EDUCATION/TRAINING PROGRAM

## 2019-12-06 PROCEDURE — 25000125 ZZHC RX 250: Performed by: STUDENT IN AN ORGANIZED HEALTH CARE EDUCATION/TRAINING PROGRAM

## 2019-12-06 PROCEDURE — 25500064 ZZH RX 255 OP 636: Performed by: NEUROLOGICAL SURGERY

## 2019-12-06 PROCEDURE — C1760 CLOSURE DEV, VASC: HCPCS

## 2019-12-06 PROCEDURE — 25000132 ZZH RX MED GY IP 250 OP 250 PS 637: Performed by: STUDENT IN AN ORGANIZED HEALTH CARE EDUCATION/TRAINING PROGRAM

## 2019-12-06 PROCEDURE — 36415 COLL VENOUS BLD VENIPUNCTURE: CPT | Performed by: STUDENT IN AN ORGANIZED HEALTH CARE EDUCATION/TRAINING PROGRAM

## 2019-12-06 PROCEDURE — 86850 RBC ANTIBODY SCREEN: CPT | Performed by: STUDENT IN AN ORGANIZED HEALTH CARE EDUCATION/TRAINING PROGRAM

## 2019-12-06 PROCEDURE — 96365 THER/PROPH/DIAG IV INF INIT: CPT

## 2019-12-06 PROCEDURE — 27210908 ZZH NEEDLE CR4

## 2019-12-06 PROCEDURE — 97530 THERAPEUTIC ACTIVITIES: CPT | Mod: GP | Performed by: PHYSICAL THERAPIST

## 2019-12-06 PROCEDURE — 70553 MRI BRAIN STEM W/O & W/DYE: CPT

## 2019-12-06 PROCEDURE — 86901 BLOOD TYPING SEROLOGIC RH(D): CPT | Performed by: STUDENT IN AN ORGANIZED HEALTH CARE EDUCATION/TRAINING PROGRAM

## 2019-12-06 PROCEDURE — 93306 TTE W/DOPPLER COMPLETE: CPT

## 2019-12-06 PROCEDURE — 27210732 ZZH ACCESSORY CR1

## 2019-12-06 PROCEDURE — 87640 STAPH A DNA AMP PROBE: CPT | Performed by: STUDENT IN AN ORGANIZED HEALTH CARE EDUCATION/TRAINING PROGRAM

## 2019-12-06 PROCEDURE — 70450 CT HEAD/BRAIN W/O DYE: CPT

## 2019-12-06 PROCEDURE — 36224 PLACE CATH CAROTD ART: CPT | Mod: 50

## 2019-12-06 PROCEDURE — 85384 FIBRINOGEN ACTIVITY: CPT | Performed by: STUDENT IN AN ORGANIZED HEALTH CARE EDUCATION/TRAINING PROGRAM

## 2019-12-06 PROCEDURE — 85027 COMPLETE CBC AUTOMATED: CPT | Performed by: STUDENT IN AN ORGANIZED HEALTH CARE EDUCATION/TRAINING PROGRAM

## 2019-12-06 PROCEDURE — 99291 CRITICAL CARE FIRST HOUR: CPT | Mod: Z6 | Performed by: EMERGENCY MEDICINE

## 2019-12-06 PROCEDURE — 99152 MOD SED SAME PHYS/QHP 5/>YRS: CPT

## 2019-12-06 PROCEDURE — 36226 PLACE CATH VERTEBRAL ART: CPT

## 2019-12-06 PROCEDURE — 97116 GAIT TRAINING THERAPY: CPT | Mod: GP | Performed by: PHYSICAL THERAPIST

## 2019-12-06 PROCEDURE — 00000146 ZZHCL STATISTIC GLUCOSE BY METER IP

## 2019-12-06 PROCEDURE — 25000125 ZZHC RX 250: Performed by: EMERGENCY MEDICINE

## 2019-12-06 PROCEDURE — 99285 EMERGENCY DEPT VISIT HI MDM: CPT | Mod: 25

## 2019-12-06 PROCEDURE — 25000131 ZZH RX MED GY IP 250 OP 636 PS 637: Performed by: STUDENT IN AN ORGANIZED HEALTH CARE EDUCATION/TRAINING PROGRAM

## 2019-12-06 PROCEDURE — 87641 MR-STAPH DNA AMP PROBE: CPT | Performed by: STUDENT IN AN ORGANIZED HEALTH CARE EDUCATION/TRAINING PROGRAM

## 2019-12-06 PROCEDURE — 92526 ORAL FUNCTION THERAPY: CPT | Mod: GN

## 2019-12-06 PROCEDURE — C1769 GUIDE WIRE: HCPCS

## 2019-12-06 PROCEDURE — A9585 GADOBUTROL INJECTION: HCPCS | Performed by: NEUROLOGICAL SURGERY

## 2019-12-06 PROCEDURE — C1887 CATHETER, GUIDING: HCPCS

## 2019-12-06 RX ORDER — NIMODIPINE 30 MG/1
60 CAPSULE, LIQUID FILLED ORAL EVERY 4 HOURS
Status: DISCONTINUED | OUTPATIENT
Start: 2019-12-06 | End: 2019-12-10

## 2019-12-06 RX ORDER — NALOXONE HYDROCHLORIDE 0.4 MG/ML
.1-.4 INJECTION, SOLUTION INTRAMUSCULAR; INTRAVENOUS; SUBCUTANEOUS
Status: DISCONTINUED | OUTPATIENT
Start: 2019-12-06 | End: 2019-12-07

## 2019-12-06 RX ORDER — GLIPIZIDE 2.5 MG/1
1 TABLET, EXTENDED RELEASE ORAL DAILY
Status: ON HOLD | COMMUNITY
Start: 2019-08-20 | End: 2019-12-13

## 2019-12-06 RX ORDER — NICOTINE POLACRILEX 4 MG
15-30 LOZENGE BUCCAL
Status: CANCELLED | OUTPATIENT
Start: 2019-12-06

## 2019-12-06 RX ORDER — NICOTINE POLACRILEX 4 MG
15-30 LOZENGE BUCCAL
Status: DISCONTINUED | OUTPATIENT
Start: 2019-12-06 | End: 2019-12-07

## 2019-12-06 RX ORDER — LABETALOL 20 MG/4 ML (5 MG/ML) INTRAVENOUS SYRINGE
10-20 EVERY 4 HOURS PRN
Status: DISCONTINUED | OUTPATIENT
Start: 2019-12-06 | End: 2019-12-06

## 2019-12-06 RX ORDER — FENTANYL CITRATE 50 UG/ML
25-50 INJECTION, SOLUTION INTRAMUSCULAR; INTRAVENOUS EVERY 5 MIN PRN
Status: DISCONTINUED | OUTPATIENT
Start: 2019-12-06 | End: 2019-12-06

## 2019-12-06 RX ORDER — DEXTROSE MONOHYDRATE 25 G/50ML
25-50 INJECTION, SOLUTION INTRAVENOUS
Status: DISCONTINUED | OUTPATIENT
Start: 2019-12-06 | End: 2019-12-13 | Stop reason: HOSPADM

## 2019-12-06 RX ORDER — HEPARIN SOD,PORCINE/0.9 % NACL 5K/1000 ML
1000-10000 INTRAVENOUS SOLUTION INTRAVENOUS
Status: COMPLETED | OUTPATIENT
Start: 2019-12-06 | End: 2019-12-06

## 2019-12-06 RX ORDER — SODIUM CHLORIDE 9 MG/ML
INJECTION, SOLUTION INTRAVENOUS CONTINUOUS
Status: DISCONTINUED | OUTPATIENT
Start: 2019-12-06 | End: 2019-12-07

## 2019-12-06 RX ORDER — AMOXICILLIN 250 MG
1-2 CAPSULE ORAL 2 TIMES DAILY
Status: DISCONTINUED | OUTPATIENT
Start: 2019-12-06 | End: 2019-12-13 | Stop reason: HOSPADM

## 2019-12-06 RX ORDER — HYDRALAZINE HYDROCHLORIDE 20 MG/ML
10-20 INJECTION INTRAMUSCULAR; INTRAVENOUS
Status: DISCONTINUED | OUTPATIENT
Start: 2019-12-06 | End: 2019-12-09

## 2019-12-06 RX ORDER — DEXTROSE MONOHYDRATE 25 G/50ML
25-50 INJECTION, SOLUTION INTRAVENOUS
Status: CANCELLED | OUTPATIENT
Start: 2019-12-06

## 2019-12-06 RX ORDER — SODIUM CHLORIDE 9 MG/ML
INJECTION, SOLUTION INTRAVENOUS CONTINUOUS
Status: CANCELLED | OUTPATIENT
Start: 2019-12-06

## 2019-12-06 RX ORDER — GADOBUTROL 604.72 MG/ML
10 INJECTION INTRAVENOUS ONCE
Status: COMPLETED | OUTPATIENT
Start: 2019-12-06 | End: 2019-12-06

## 2019-12-06 RX ORDER — NIMODIPINE 30 MG/1
60 CAPSULE, LIQUID FILLED ORAL EVERY 4 HOURS
Status: DISCONTINUED | OUTPATIENT
Start: 2019-12-06 | End: 2019-12-06

## 2019-12-06 RX ORDER — ATORVASTATIN CALCIUM 20 MG/1
20 TABLET, FILM COATED ORAL DAILY
COMMUNITY

## 2019-12-06 RX ORDER — IODIXANOL 320 MG/ML
150 INJECTION, SOLUTION INTRAVASCULAR ONCE
Status: COMPLETED | OUTPATIENT
Start: 2019-12-06 | End: 2019-12-06

## 2019-12-06 RX ORDER — ONDANSETRON 2 MG/ML
4 INJECTION INTRAMUSCULAR; INTRAVENOUS EVERY 6 HOURS PRN
Status: DISCONTINUED | OUTPATIENT
Start: 2019-12-06 | End: 2019-12-13 | Stop reason: HOSPADM

## 2019-12-06 RX ORDER — HYDRALAZINE HYDROCHLORIDE 20 MG/ML
10 INJECTION INTRAMUSCULAR; INTRAVENOUS EVERY 6 HOURS PRN
Status: DISCONTINUED | OUTPATIENT
Start: 2019-12-06 | End: 2019-12-06

## 2019-12-06 RX ORDER — POLYETHYLENE GLYCOL 3350 17 G/17G
17 POWDER, FOR SOLUTION ORAL DAILY
Status: DISCONTINUED | OUTPATIENT
Start: 2019-12-06 | End: 2019-12-08

## 2019-12-06 RX ORDER — LISINOPRIL 10 MG/1
1 TABLET ORAL DAILY
COMMUNITY
Start: 2019-09-19

## 2019-12-06 RX ORDER — FENTANYL CITRATE 50 UG/ML
25-50 INJECTION, SOLUTION INTRAMUSCULAR; INTRAVENOUS EVERY 5 MIN PRN
Status: DISCONTINUED | OUTPATIENT
Start: 2019-12-06 | End: 2019-12-09

## 2019-12-06 RX ORDER — HYDRALAZINE HYDROCHLORIDE 20 MG/ML
10-20 INJECTION INTRAMUSCULAR; INTRAVENOUS EVERY 4 HOURS PRN
Status: DISCONTINUED | OUTPATIENT
Start: 2019-12-06 | End: 2019-12-06

## 2019-12-06 RX ORDER — FLUMAZENIL 0.1 MG/ML
0.2 INJECTION, SOLUTION INTRAVENOUS
Status: DISCONTINUED | OUTPATIENT
Start: 2019-12-06 | End: 2019-12-07

## 2019-12-06 RX ORDER — ACETAMINOPHEN 650 MG/1
650 SUPPOSITORY RECTAL EVERY 4 HOURS PRN
Status: DISCONTINUED | OUTPATIENT
Start: 2019-12-06 | End: 2019-12-09

## 2019-12-06 RX ORDER — LABETALOL 20 MG/4 ML (5 MG/ML) INTRAVENOUS SYRINGE
10-20
Status: DISCONTINUED | OUTPATIENT
Start: 2019-12-06 | End: 2019-12-09

## 2019-12-06 RX ORDER — FLUMAZENIL 0.1 MG/ML
0.2 INJECTION, SOLUTION INTRAVENOUS
Status: DISCONTINUED | OUTPATIENT
Start: 2019-12-06 | End: 2019-12-06

## 2019-12-06 RX ORDER — ALBUTEROL SULFATE 90 UG/1
2 AEROSOL, METERED RESPIRATORY (INHALATION) 4 TIMES DAILY PRN
COMMUNITY

## 2019-12-06 RX ORDER — ATORVASTATIN CALCIUM 40 MG/1
40 TABLET, FILM COATED ORAL EVERY EVENING
Status: DISCONTINUED | OUTPATIENT
Start: 2019-12-06 | End: 2019-12-07

## 2019-12-06 RX ORDER — ACETAMINOPHEN 325 MG/1
650 TABLET ORAL EVERY 4 HOURS PRN
Status: DISCONTINUED | OUTPATIENT
Start: 2019-12-06 | End: 2019-12-09

## 2019-12-06 RX ORDER — OXYCODONE HYDROCHLORIDE 5 MG/1
5 TABLET ORAL EVERY 4 HOURS PRN
Status: DISCONTINUED | OUTPATIENT
Start: 2019-12-06 | End: 2019-12-09

## 2019-12-06 RX ORDER — NICOTINE POLACRILEX 4 MG
15-30 LOZENGE BUCCAL
Status: DISCONTINUED | OUTPATIENT
Start: 2019-12-06 | End: 2019-12-13 | Stop reason: HOSPADM

## 2019-12-06 RX ORDER — INSULIN GLARGINE 100 [IU]/ML
11 INJECTION, SOLUTION SUBCUTANEOUS EVERY EVENING
Status: ON HOLD | COMMUNITY
End: 2019-12-13

## 2019-12-06 RX ORDER — LIDOCAINE 40 MG/G
CREAM TOPICAL
Status: CANCELLED | OUTPATIENT
Start: 2019-12-06

## 2019-12-06 RX ORDER — NALOXONE HYDROCHLORIDE 0.4 MG/ML
.1-.4 INJECTION, SOLUTION INTRAMUSCULAR; INTRAVENOUS; SUBCUTANEOUS
Status: DISCONTINUED | OUTPATIENT
Start: 2019-12-06 | End: 2019-12-06

## 2019-12-06 RX ORDER — HEPARIN SODIUM 1000 [USP'U]/ML
500-6000 INJECTION, SOLUTION INTRAVENOUS; SUBCUTANEOUS
Status: COMPLETED | OUTPATIENT
Start: 2019-12-06 | End: 2019-12-06

## 2019-12-06 RX ORDER — DEXTROSE MONOHYDRATE 25 G/50ML
25-50 INJECTION, SOLUTION INTRAVENOUS
Status: DISCONTINUED | OUTPATIENT
Start: 2019-12-06 | End: 2019-12-07

## 2019-12-06 RX ORDER — POLYETHYLENE GLYCOL 3350 17 G/17G
17 POWDER, FOR SOLUTION ORAL DAILY
Status: DISCONTINUED | OUTPATIENT
Start: 2019-12-06 | End: 2019-12-06

## 2019-12-06 RX ORDER — NALOXONE HYDROCHLORIDE 0.4 MG/ML
.1-.4 INJECTION, SOLUTION INTRAMUSCULAR; INTRAVENOUS; SUBCUTANEOUS
Status: DISCONTINUED | OUTPATIENT
Start: 2019-12-06 | End: 2019-12-13 | Stop reason: HOSPADM

## 2019-12-06 RX ORDER — ONDANSETRON 4 MG/1
4 TABLET, ORALLY DISINTEGRATING ORAL EVERY 6 HOURS PRN
Status: DISCONTINUED | OUTPATIENT
Start: 2019-12-06 | End: 2019-12-13 | Stop reason: HOSPADM

## 2019-12-06 RX ADMIN — MIDAZOLAM 0.5 MG: 1 INJECTION INTRAMUSCULAR; INTRAVENOUS at 19:52

## 2019-12-06 RX ADMIN — FENTANYL CITRATE 25 MCG: 50 INJECTION, SOLUTION INTRAMUSCULAR; INTRAVENOUS at 19:40

## 2019-12-06 RX ADMIN — NICARDIPINE HYDROCHLORIDE 2.5 MG/HR: 0.2 INJECTION, SOLUTION INTRAVENOUS at 10:45

## 2019-12-06 RX ADMIN — INSULIN ASPART 2 UNITS: 100 INJECTION, SOLUTION INTRAVENOUS; SUBCUTANEOUS at 09:10

## 2019-12-06 RX ADMIN — FENTANYL CITRATE 50 MCG: 50 INJECTION, SOLUTION INTRAMUSCULAR; INTRAVENOUS at 14:00

## 2019-12-06 RX ADMIN — MORPHINE SULFATE 4 MG: 4 INJECTION INTRAVENOUS at 01:14

## 2019-12-06 RX ADMIN — NIMODIPINE 60 MG: 30 CAPSULE, LIQUID FILLED ORAL at 16:52

## 2019-12-06 RX ADMIN — MIDAZOLAM 1 MG: 1 INJECTION INTRAMUSCULAR; INTRAVENOUS at 19:12

## 2019-12-06 RX ADMIN — FENTANYL CITRATE 25 MCG: 50 INJECTION, SOLUTION INTRAMUSCULAR; INTRAVENOUS at 14:10

## 2019-12-06 RX ADMIN — NIMODIPINE 60 MG: 30 CAPSULE, LIQUID FILLED ORAL at 20:56

## 2019-12-06 RX ADMIN — NIMODIPINE 60 MG: 30 CAPSULE, LIQUID FILLED ORAL at 12:56

## 2019-12-06 RX ADMIN — ACETAMINOPHEN 650 MG: 325 TABLET, FILM COATED ORAL at 20:56

## 2019-12-06 RX ADMIN — ATORVASTATIN CALCIUM 40 MG: 40 TABLET, FILM COATED ORAL at 20:56

## 2019-12-06 RX ADMIN — FENTANYL CITRATE 25 MCG: 50 INJECTION, SOLUTION INTRAMUSCULAR; INTRAVENOUS at 14:30

## 2019-12-06 RX ADMIN — MIDAZOLAM 0.5 MG: 1 INJECTION INTRAMUSCULAR; INTRAVENOUS at 14:10

## 2019-12-06 RX ADMIN — FENTANYL CITRATE 50 MCG: 50 INJECTION, SOLUTION INTRAMUSCULAR; INTRAVENOUS at 19:05

## 2019-12-06 RX ADMIN — ACETAMINOPHEN 650 MG: 325 TABLET, FILM COATED ORAL at 04:39

## 2019-12-06 RX ADMIN — ACETAMINOPHEN 650 MG: 325 TABLET, FILM COATED ORAL at 09:12

## 2019-12-06 RX ADMIN — SODIUM CHLORIDE: 9 INJECTION, SOLUTION INTRAVENOUS at 04:46

## 2019-12-06 RX ADMIN — NICARDIPINE HYDROCHLORIDE 2.5 MG/HR: 0.2 INJECTION, SOLUTION INTRAVENOUS at 01:12

## 2019-12-06 RX ADMIN — ACETAMINOPHEN 650 MG: 325 TABLET, FILM COATED ORAL at 16:52

## 2019-12-06 RX ADMIN — INSULIN ASPART 5 UNITS: 100 INJECTION, SOLUTION INTRAVENOUS; SUBCUTANEOUS at 23:26

## 2019-12-06 RX ADMIN — INSULIN ASPART 3 UNITS: 100 INJECTION, SOLUTION INTRAVENOUS; SUBCUTANEOUS at 06:07

## 2019-12-06 RX ADMIN — INSULIN ASPART 3 UNITS: 100 INJECTION, SOLUTION INTRAVENOUS; SUBCUTANEOUS at 16:20

## 2019-12-06 RX ADMIN — HEPARIN SODIUM 2000 UNITS: 1000 INJECTION INTRAVENOUS; SUBCUTANEOUS at 14:20

## 2019-12-06 RX ADMIN — MIDAZOLAM 1 MG: 1 INJECTION INTRAMUSCULAR; INTRAVENOUS at 14:00

## 2019-12-06 RX ADMIN — GADOBUTROL 9 ML: 604.72 INJECTION INTRAVENOUS at 20:31

## 2019-12-06 RX ADMIN — NIMODIPINE 60 MG: 30 CAPSULE, LIQUID FILLED ORAL at 09:12

## 2019-12-06 RX ADMIN — NIMODIPINE 60 MG: 30 CAPSULE, LIQUID FILLED ORAL at 04:58

## 2019-12-06 RX ADMIN — SENNOSIDES AND DOCUSATE SODIUM 1 TABLET: 8.6; 5 TABLET ORAL at 09:12

## 2019-12-06 RX ADMIN — ONDANSETRON 4 MG: 2 INJECTION INTRAMUSCULAR; INTRAVENOUS at 19:55

## 2019-12-06 RX ADMIN — LIDOCAINE HYDROCHLORIDE 10 ML: 10 INJECTION, SOLUTION EPIDURAL; INFILTRATION; INTRACAUDAL; PERINEURAL at 14:37

## 2019-12-06 RX ADMIN — SODIUM CHLORIDE 20000 UNITS: 9 INJECTION, SOLUTION INTRAVENOUS at 14:14

## 2019-12-06 RX ADMIN — IODIXANOL 100 ML: 320 INJECTION, SOLUTION INTRAVASCULAR at 14:39

## 2019-12-06 RX ADMIN — POLYETHYLENE GLYCOL 3350 17 G: 17 POWDER, FOR SOLUTION ORAL at 09:12

## 2019-12-06 RX ADMIN — ACETAMINOPHEN 650 MG: 325 TABLET, FILM COATED ORAL at 12:56

## 2019-12-06 RX ADMIN — MIDAZOLAM 1 MG: 1 INJECTION INTRAMUSCULAR; INTRAVENOUS at 19:05

## 2019-12-06 RX ADMIN — OXYCODONE HYDROCHLORIDE 5 MG: 5 TABLET ORAL at 17:56

## 2019-12-06 RX ADMIN — FENTANYL CITRATE 50 MCG: 50 INJECTION, SOLUTION INTRAMUSCULAR; INTRAVENOUS at 19:52

## 2019-12-06 RX ADMIN — INSULIN ASPART 2 UNITS: 100 INJECTION, SOLUTION INTRAVENOUS; SUBCUTANEOUS at 11:30

## 2019-12-06 RX ADMIN — MIDAZOLAM 0.5 MG: 1 INJECTION INTRAMUSCULAR; INTRAVENOUS at 14:30

## 2019-12-06 RX ADMIN — MIDAZOLAM 0.5 MG: 1 INJECTION INTRAMUSCULAR; INTRAVENOUS at 19:40

## 2019-12-06 RX ADMIN — SODIUM CHLORIDE: 9 INJECTION, SOLUTION INTRAVENOUS at 16:34

## 2019-12-06 ASSESSMENT — VISUAL ACUITY
OU: NORMAL ACUITY
OU: BASELINE
OU: NORMAL ACUITY
OU: NORMAL ACUITY
OU: BASELINE
OU: NORMAL ACUITY
OU: BASELINE
OU: BASELINE
OU: NORMAL ACUITY
OU: NORMAL ACUITY
OU: BASELINE
OU: NORMAL ACUITY

## 2019-12-06 ASSESSMENT — ACTIVITIES OF DAILY LIVING (ADL)
ADLS_ACUITY_SCORE: 12
ADLS_ACUITY_SCORE: 12
ADLS_ACUITY_SCORE: 14
ADLS_ACUITY_SCORE: 12

## 2019-12-06 ASSESSMENT — MIFFLIN-ST. JEOR: SCORE: 1749.94

## 2019-12-06 ASSESSMENT — PAIN DESCRIPTION - DESCRIPTORS
DESCRIPTORS: ACHING
DESCRIPTORS: ACHING
DESCRIPTORS: HEADACHE
DESCRIPTORS: HEADACHE

## 2019-12-06 NOTE — PLAN OF CARE
PT 4A: Evaluation completed, treatment initiated.    Discharge Planner PT   Patient plan for discharge: home   Current status: patient transferred to EOB independently.  Sit <> stand with SBA. Ambulated short distance in room without AD and SBA.  No LOB noted.  Mobility currently limited by fatigue/headache. VSS on RA during session.   Barriers to return to prior living situation: medical status  Recommendations for discharge: home  Rationale for recommendations: based on PLOF anticipate patient will be independent with functional mobility during expected length of acute stay.        Entered by: Yevgeniy Seay 12/06/2019 1:39 PM

## 2019-12-06 NOTE — IR NOTE
D: Patient arrived at 1355 . Table ready at 1355  Vanesa Harmon underwent a  Cerebral  angiogram  by Dr James on 12/6/2019  Time out done at 1400  Patient identity confirmed with 2 identifiers:  Yes  Site marked:  No    Support staff present for case :  Dr. James arrived at 1400.  RIA Minaya arrived at 1355.  Rebel Restrepo arrived at 1355.    A:  Alert male transported via cart from  to IR Procedure Room 3 for planned intervention.  ID band confirmed and patient acknowledges understanding of planned procedure. Patient repositioned to procedure table via hover-mat and positioned supine.  Patient prepped and draped per policy see VS flowsheet, MAR for further information. Pre-procedure neuro exam performed and documented in EMR.    Prior to procedure, distal pulses were identified and marked via RN.  Bilateral pulses are 2+.  Intact CMS.    Start time of procedure: 1400   Puncture made to: right @ 1402      Sedation Medication given: No  Yes, see MAR   Medication total dose given-  Versed  2 mg  Fentanyl  100 mcg  Other: Heparin 2000 units given @ 1420    Start of Sedation Time: 1400  End of Sedation Time: 1445  Total Sedation Time: 45 minutes    IR Nurse monitoring times: Start:1400                                                 Stop:1445    Introducer removed at 1435.  Manual pressure held for 5 minutes.  Closure device deployed at 1435.  Groin site appearance is WDL.  Distal pulses post procedure are 2+.  Right groin site is cleansed and dressed per protocol.     Groin site appearance : WDL  End time of procedure : 1445    R: Per MD, bedrest for   Hours.  Post-procedure neuro exam performed and documented in EMR. Procedural report provided to RIA Guerrero at 1440.  Patient transferred to  post procedure.       P:  Patient to recover in   Follow up : per MD instructions     Post Procedure Education:  The following information has been reviewed with patient  1. Maintain bedrest with right extremity straight  until 1640.  2. Notify nurse immediately if having any bleeding from site, any changes in sensation, or changes in strength.  3. Notify nurse if you have to go the bathroom, the staff will assist you.  4. Nurses will be doing frequent assessments post procedure, which will include checking the pulses in your feet, groin/access site, vital signs, and neuro exam.   5. Please press your call light if you, or your family, have any questions or concerns regarding your care.    Learner's response to angiogram education: patient needs reinforcement due to sedation.    Marimar Mena RN

## 2019-12-06 NOTE — PLAN OF CARE
Admitted/transferred from: [facility] @ 0430  Reason for admission/transfer: SAH  Patient status upon admission/transfer: Stable on RA, c/o HA  Interventions: Neuro check and continued Nicardipine gtt, gave Tylenol  Plan: Neuro checks, BP goal<140, pain mgmt  2 RN skin assessment: completed by Liliana ROLLINS and Nidia MALLORY RN  Result of skin assessment and interventions/actions: Intact. Bruises on toenails from stubbed toe  Height, weight, drug calc weight: done  Patient belongings: Clothes with wife  MDRO education (if applicable): swab completed      [id number]:  Neuro: Intact, just c/o HA and intermittent nausea. Received Tylenol.   Card: ST no ectopy. BP goal <140; Nicardipine gtt titrated to goal. NS@75/hr.   Pulm: Lungs clear. RA.   GI: NPO except meds, passed swallow study. Intermittent nausea, no antiemetics at this time. Last BM 12/5.   : Per pt, voided in ED. Urinal at bedside.   Endo:  on admission, recheck 276 and corrected per orders.   Skin: Intact.     Wife at bedside. Stroke handbook given. Will continue to monitor and notify team with updates.

## 2019-12-06 NOTE — PLAN OF CARE
Discharge Planner SLP   Patient plan for discharge: Pt did not state  Current status: Clinical swallow eval completed per MD order.  Pt presents with a functional oropharyngeal swallowing mechanism in the setting of subarachnoid hemorrhage.  Recommend regular diet and thin liquids.  Ensure pt is fully alert and upright for all PO, given small bites/sips, alternating bites/sips.  No overt s/sx of aspiration with thin via cup/straw, puree, solid texture.  Oral phase WFL across consistencies.  Pt following safe swallow precautions w/o cues.   Per pt report and informal observation, no speech-language deficits present.  SLP to follow for short course for PO tolerance.  Barriers to return to prior living situation: Medical readiness  Recommendations for discharge: Defer to PT/OT  Rationale for recommendations: Anticipate pt will meet goals prior to discharge       Entered by: Kerline Patterson 12/06/2019 10:12 AM

## 2019-12-06 NOTE — PLAN OF CARE
OT 4AB: OT orders received and acknowledged. Per discussion with PT, pt likely with no OT needs as cognition intact and demonstrating appropriate strength for ADLs. OT to hold at this time with PT following. Will initiate or defer as indicated.

## 2019-12-06 NOTE — LETTER
[address] [facility] [address] [address] [phone]        To Whom it May Concern,      Mr Vanesa Harmon is admitted to the hospital and being closely observed for a critical condition. Please excuse him from all work duties during the course of his hospitalization.          Regards,            Juma Segal MD  [facility]

## 2019-12-06 NOTE — ED PROVIDER NOTES
History     Chief Complaint:  Headache    The history is provided by the patient and the spouse.      Vanesa Harmon is a 44 year old type II diabetic male who presents to the emergency department today for evaluation of a headache. The patient reports that around 2100 tonight while intercourse he had a sudden onset of a severe headache. He states the pain radiates down the middle of his neck. The patient has been sensitive to light, nauseated and vomiting; he took two Ibuprofen prior to arrival which did not help. He has been speaking normally per his wife, and has not had any fevers. The patient is not anticoagulated.     Allergies:  No Known Drug Allergies     Medications:    Lisinopril  Atorvastatin   Albuterol inhaler   Metformin  Lantus Solstar    Past Medical History:    Diabetes, type II   Asthma     Past Surgical History:    Surgical history reviewed. No pertinent surgical history.    Family History:    Hypertension  Cancer- leukemia  Cataract   Diabetes     Social History:  The patient was accompanied to the ED by his wife.  Smoking Status: Former smoker   Smokeless Tobacco: Never Used  Alcohol Use: Positive, occasional  Drug Use: Negative    Marital Status:       Review of Systems   Constitutional: Negative for fever.   Eyes: Positive for photophobia.   Gastrointestinal: Positive for nausea and vomiting.   Neurological: Positive for headaches. Negative for speech difficulty.   All other systems reviewed and are negative.      Physical Exam     Patient Vitals for the past 24 hrs:   BP Temp Temp src Pulse Heart Rate Resp SpO2   12/06/19 0130 138/85 -- -- 100 98 -- 100 %   12/06/19 0125 139/88 -- -- 96 96 -- 100 %   12/06/19 0120 135/86 -- -- 99 93 -- 100 %   12/06/19 0115 (!) 140/85 -- -- 93 95 -- 100 %   12/06/19 0110 (!) 141/86 -- -- 97 -- -- 100 %   12/06/19 0045 (!) 140/86 -- -- 100 -- -- 100 %   12/05/19 2215 (!) 141/89 97.1  F (36.2  C) Oral 89 89 18 100 %      Physical Exam  Nursing note and  vitals reviewed.  Constitutional: Cooperative. Uncomfortable appearing.  HENT:   Mouth/Throat: Mucous membranes are normal.   Eyes: Pupils are equal, round, and reactive to light. EOMI  Cardiovascular: Normal rate, regular rhythm and normal heart sounds.  No murmur.  Pulmonary/Chest: Effort normal and breath sounds normal. No respiratory distress. No wheezes. No rales.   Abdominal: Soft. Normal appearance and bowel sounds are normal.  There is no tenderness.  Musculoskeletal:  No neck rigidity.   Neurological: Alert. Oriented x4.  Strength normal.  CN 2-12 intact.   Skin: Skin is warm and dry.  Psychiatric: Normal mood and affect.      Emergency Department Course     Imaging:  Radiology findings were communicated with the patient and family who voiced understanding of the findings.    CT, Head without Contrast  1.  Small volume acute subarachnoid hemorrhage in the prepontine and premedullary cisterns extending into the ventral upper spinal canal.  2.  Benign-appearing expansile lesion within the superior right orbital rim, possibly representing a meningioma or fibrous dysplasia.  Reading per radiology      CTA, Head and Neck with Contrast  HEAD CTA:   1.  No branch vessel occlusion, high-grade stenosis, aneurysm, or high flow vascular malformation involving proximal [place] vessels.    NECK CTA:  1.  No significant stenosis in the neck vessels based on NASCET criteria.  2.  No evidence for dissection.  Reading per radiology       Laboratory:  Laboratory findings were communicated with the patient and family who voiced understanding of the findings.    CBC: WNL (WBC 12.4, HGB 12.8, )  BMP: Glucose 286 (H). O/w WNL (Creatinine 0.68)      PTT: 28  INR: 0.95     ABO/Rh Type and Screen: B Negative. Negative antibody screen.       Interventions:  2242 Zofran 4 mg IV   2318 Morphine 4 mg IV  0112 Nicardipine 2.5 mg/hr IV drip  0114 Morphine 4 mg IV    Emergency Department Course:  2300 Nursing notes and  vitals reviewed.  2303 I performed an exam of the patient as documented above.   2315 IV was inserted and blood was drawn for laboratory testing, results above.   2338 The patient was sent for a CT and CTA while in the emergency department, results above.    0040 I spoke with Dr. Corea of the Radiology service regarding patient's CT and CTA results.   0043 I updated the patient and family with imaging results and discussed the diagnosis and plan for transfer.  0054  I spoke with Dr. Eaton of the neurosurgery service from the [facility] regarding patient's presentation, findings, and plan of care. Plan to transfer to the [facility] ER for admission to a neuro ICU bed.   0059 I spoke with the emergency medicine service from the [facility] regarding patient's presentation, findings, and plan of care.     Findings and plan explained to the Patient and spouse. Patient will be transferred to the [facility] emergency room via EMS. Discussed the case with Dr. Eaton, who will admit the patient to a monitored bed for further monitoring, evaluation, and treatment.     I personally reviewed the laboratory and imaging results with the Patient and spouse and answered all related questions prior to transfer.      Impression & Plan      Medical Decision Making:  Vanesa Harmon is a 44 year old gentleman who presents with a post-intercourse sudden onset headache. Unfortunately, we do identify a subarachnoid hemorrhage in the pontine system extending into the ventral spinal cord. No abnormal neurologic findings on exam. His pressure is reasonably controlled at 140 systolic. Per neurosurgery, these are the parameters they would want with systolic staying below 140, so we will start a low dose Nicardipine drip. He will be transferred through the emergency room at the [facility] with plan for admission to the Neuro ICU and formal angiography tomorrow morning. The patient has had his  questions answered and is resting comfortably. Pain is well controlled.       Critical Care time:  Was 45 minutes for this patient excluding procedures.    Diagnosis:    ICD-10-CM    1. SAH (subarachnoid hemorrhage) (H) I60.9        Disposition:  The patient was transferred to the [facility].    Scribe Disclosure:  Trupti FULLER, am serving as a scribe at 11:01 PM on 12/5/2019 to document services personally performed by Kurtis Montano MD based on my observations and the provider's statements to me.    12/5/2019   [facility] EMERGENCY DEPARTMENT       Kurtis Montano MD  12/06/19 0219

## 2019-12-06 NOTE — PROGRESS NOTES
[facility], [place]     Endovascular Surgical Neuroradiology Pre-Procedure Note      HPI:  Vanesa Harmon is a 44 year old male  with past medical history of HTN, DM, asthma, HLD who presented with headache and was found to have SAH in the pre-pontine and pre-medullary cisterns. CTA did not show any vascular abnormality. He is intact neurologically.    Medical History:  Past Medical History:   Diagnosis Date     Asthma      SAH (subarachnoid hemorrhage) 12/05/2019    during intercourse     Type 2 diabetes mellitus        Surgical History:  History reviewed. No pertinent surgical history.    Family History:  Family History   Problem Relation Age of Onset     Cancer Mother         leukemia     Diabetes Father      Hypertension Father      Cancer Brother         leukemia        Social History:  Social History     Socioeconomic History     Marital status:      Spouse name: Not on file     Number of children: 2     Years of education: Not on file     Highest education level: Not on file   Occupational History     Employer: [employer]   Social Needs     Financial resource strain: Not on file     Food insecurity:     Worry: Not on file     Inability: Not on file     Transportation needs:     Medical: Not on file     Non-medical: Not on file   Tobacco Use     Smoking status: Current Some Day Smoker     Packs/day: 0.25     Types: Cigarettes     Smokeless tobacco: Current User   Substance and Sexual Activity     Alcohol use: Yes     Comment: very rare     Drug use: No     Sexual activity: Yes     Partners: Female   Lifestyle     Physical activity:     Days per week: Not on file     Minutes per session: Not on file     Stress: Not on file   Relationships     Social connections:     Talks on phone: Not on file     Gets together: Not on file     Attends Yazidi service: Not on file     Active member of club or organization: Not on file     Attends meetings of clubs or organizations: Not on file      Relationship status: Not on file     Intimate partner violence:     Fear of current or ex partner: Not on file     Emotionally abused: Not on file     Physically abused: Not on file     Forced sexual activity: Not on file   Other Topics Concern      Service Not Asked     Blood Transfusions No     Caffeine Concern No     Occupational Exposure No     Comment: dust at work , chemicals     Hobby Hazards No     Sleep Concern Yes     Comment: snores      Stress Concern No     Weight Concern No     Comment: weight gain      Special Diet No     Back Care Yes     Comment: occ lBP     Exercise Yes     Comment: work is physical      Bike Helmet Not Asked     Seat Belt Not Asked     Self-Exams Not Asked   Social History Narrative     Not on file       Allergies:  No Known Allergies    Is there a contrast allergy?  No    Medications:  Current Facility-Administered Medications   Medication     acetaminophen (TYLENOL) tablet 650 mg    Or     acetaminophen (TYLENOL) solution 650 mg    Or     acetaminophen (TYLENOL) Suppository 650 mg     atorvastatin (LIPITOR) tablet 40 mg     glucose gel 15-30 g    Or     dextrose 50 % injection 25-50 mL    Or     glucagon injection 1 mg     fentaNYL (PF) (SUBLIMAZE) injection 25-50 mcg     flumazenil (ROMAZICON) injection 0.2 mg     insulin aspart (NovoLOG) inj (RAPID ACTING)     lidocaine 1 % 1-30 mL     midazolam (VERSED) injection 0.5-2 mg     naloxone (NARCAN) injection 0.1-0.4 mg     naloxone (NARCAN) injection 0.1-0.4 mg     niCARdipine 40 mg in 200 mL 0.9% NaCl (CARDENE) infusion     niMODipine (NIMOTOP) capsule 60 mg     ondansetron (ZOFRAN-ODT) ODT tab 4 mg    Or     ondansetron (ZOFRAN) injection 4 mg     polyethylene glycol (MIRALAX/GLYCOLAX) Packet 17 g     senna-docusate (SENOKOT-S/PERICOLACE) 8.6-50 MG per tablet 1-2 tablet     sodium chloride 0.9% infusion   .    ROS:  The 5 point Review of Systems is negative other than noted in the HPI or here.     PHYSICAL  EXAMINATION  Vital Signs:  B/P: 128/74,  T: 98.1,  P: 87,  R: 15    Cardio:  RRR  Pulmonary:  no respiratory distress  Abdomen:  soft    Neurologic  Mental Status:  attentive and oriented, follows commands, speech clear and fluent, lethargic  Cranial Nerves:  visual fields intact, PERRL, EOMI with normal smooth pursuit, facial sensation intact and symmetric, facial movements symmetric, hearing not formally tested but intact to conversation, no dysarthria, shoulder shrug strong bilaterally, tongue protrusion midline  Motor:  no abnormal movements, normal tone throughout, normal muscle bulk, no pronator drift, normal and symmetric rapid finger tapping, able to move all limbs spontaneously  Sensory:  intact/symmetric to light touch and pin prick throughout upper and lower extremities  Coordination:  FNF and HS intact without dysmetria    Pre-procedure National Institutes of Health Stroke Scale:   Not applicable    LABS  (most recent Cr, BUN, GFR, PLT, INR, PTT within the past 7 days):  Recent Labs   Lab 12/06/19  0544   CR 0.60*   BUN 10   GFRESTIMATED >90   GFRESTBLACK >90      INR 1.15*   PTT 30        Platelet Function P2Y12 (PRU):  Not applicable      ASSESSMENT: Mr. Alexis is lethargic however neurologically intact.    PLAN: Diagnostic cerebral angiogram        PRE-PROCEDURE SEDATION ASSESSMENT     Pre-Procedure Sedation Assessment done at 1300.    Expected Level:  Moderate Sedation    Indication:  Sedation is required to allow for neurointerventional procedure.    Consent obtained from patient after discussing the risks, benefits and alternatives.     PO Intake:  Appropriately NPO for procedure    ASA Class:  Class 2 - MILD SYSTEMIC DISEASE, NO ACUTE PROBLEMS, NO FUNCTIONAL LIMITATIONS.    Mallampati:  Grade 1:  Soft palate, uvula, tonsillar pillars, and posterior pharyngeal wall visible    History and physical reviewed and no updates needed. I have reviewed the lab findings, diagnostic data, medications,  and the plan for sedation. I have determined this patient to be an appropriate candidate for the planned sedation and procedure and have reassessed the patient IMMEDIATELY PRIOR to sedation and procedure.    Patient was discussed with the Attending, , who agrees with the plan.    Richi James MD   Pager: [phone].

## 2019-12-06 NOTE — PROGRESS NOTES
12/06/19 1000   Quick Adds   Type of Visit Initial PT Evaluation       Present no   Living Environment   Lives With spouse;child(mike), dependent  (15, 13, 3 yo children)   Living Arrangements apartment   Home Accessibility stairs to enter home   Number of Stairs, Main Entrance other (see comments)  (4 + 5 to enter apartment)   Stair Railings, Main Entrance other (see comments)  (1 rail)   Transportation Anticipated car, drives self;family or friend will provide   Self-Care   Usual Activity Tolerance good   Current Activity Tolerance fair   Regular Exercise No   Equipment Currently Used at Home none   Activity/Exercise/Self-Care Comment patient works full time driving [employer]   Functional Level Prior   Ambulation 0-->independent   Transferring 0-->independent   Toileting 0-->independent   Bathing 0-->independent   Communication 0-->understands/communicates without difficulty   Swallowing 0-->swallows foods/liquids without difficulty   Cognition 0 - no cognition issues reported   Fall history within last six months no   Prior Functional Level Comment independent with all functional mobility and ADLs, works in manual labor driving [employer]   General Information   Onset of Illness/Injury or Date of Surgery - Date 12/06/19   Referring Physician Clemente Vidales MD   Patient/Family Goals Statement return to home/work   Pertinent History of Current Problem (include personal factors and/or comorbidities that impact the POC) 44 year old male with history of hypertension, type 2 diabetes, asthma, and hyperlipidemia who is transferred from [facility] with subarachnoid hemorrhage. HH1, mF1. Patient is nonfocal on examination. No evidence of aneurysm on CTA head/neck, though formal angiogram in the morning will fully assess for this.   Cognitive Status Examination   Orientation orientation to person, place and time   Level of Consciousness lethargic/somnolent   Follows Commands and Answers Questions  "100% of the time   Personal Safety and Judgment intact   Memory intact   Pain Assessment   Patient Currently in Pain Yes, see Vital Sign flowsheet   Posture    Posture Forward head position   Range of Motion (ROM)   ROM Comment B UE/LE WNL   Strength   Strength Comments B UE/LE grossly 5/5   Bed Mobility   Bed Mobility Comments supine > sit independent   Transfer Skills   Transfer Comments sit > stand SBA   Gait   Gait Comments a few steps in room with SBA   Balance   Balance Comments romberg eyes closed, no LOB   Sensory Examination   Sensory Perception no deficits were identified   Coordination   Coordination no deficits were identified   General Therapy Interventions   Planned Therapy Interventions balance training;gait training;progressive activity/exercise   Clinical Impression   Criteria for Skilled Therapeutic Intervention yes, treatment indicated   PT Diagnosis impaired functional mobility in setting of SAH   Influenced by the following impairments impaired balance, decreased activity tolerance   Functional limitations due to impairments impaired gait   Clinical Presentation Stable/Uncomplicated   Clinical Presentation Rationale clinical judgment   Clinical Decision Making (Complexity) Low complexity   Therapy Frequency 6x/week   Predicted Duration of Therapy Intervention (days/wks) 1 week   Anticipated Discharge Disposition Home   Risk & Benefits of therapy have been explained Yes   Patient, Family & other staff in agreement with plan of care Yes   [facility] AM-PAC  \"6 Clicks\" V.2 Basic Mobility Inpatient Short Form   1. Turning from your back to your side while in a flat bed without using bedrails? 4 - None   2. Moving from lying on your back to sitting on the side of a flat bed without using bedrails? 4 - None   3. Moving to and from a bed to a chair (including a wheelchair)? 4 - None   4. Standing up from a chair using your arms (e.g., wheelchair, or bedside chair)? 4 - None   5. To walk in " hospital room? 3 - A Little   6. Climbing 3-5 steps with a railing? 3 - A Little   Basic Mobility Raw Score (Score out of 24.Lower scores equate to lower levels of function) 22   Total Evaluation Time   Total Evaluation Time (Minutes) 8

## 2019-12-06 NOTE — ED NOTES
Bed: ED02  Expected date:   Expected time:   Means of arrival:   Comments:  [facility] transfer  Subarachnoid hemorrhage  On nicardepine drip will admit to SICU  No neuro deficits

## 2019-12-06 NOTE — CONSULTS
Brief Note    SW consult received. Chart reviewed and pt discussed in team. No acute psychosocial concerns identified at this time. SW will follow peripherally for the time being. Please do not hesitate to reach out to SW if psychosocial concerns do arise during this admission.    LUKASZ Servin, [facility]  ICU    [facility]   P: [phone]  Pager: [phone]

## 2019-12-06 NOTE — PROGRESS NOTES
Nurse Care Coordination was consulted to assess needs of patient following stroke. Chart was reviewed and discussed with interdisciplinary team. Nurse care coordination needs are not identified at this time.  RNCC will cont. to follow plan of care.      Mag Medeiros RN, PHN, BSN  4A and 4E/ ICU  Care Coordinator  Phone: [phone]  Pager: [phone]

## 2019-12-06 NOTE — ED TRIAGE NOTES
Here for headache radiating down to chest, neck, stomach, and back started about 1.5-2 hours ago after sexual intercourse. Also c/o dizziness. Equal , no arm drift, no facial droop or slurred speech, no other neuro deficit noted. Took ibuprofen 1 hour ago and did not help. ABCs intact.

## 2019-12-06 NOTE — ED PROVIDER NOTES
History     Chief Complaint   Patient presents with     Headache     HPI  Vanesa Harmon is a 44 year old male transferred from [facility] with thunderclap headache during intercourse.  There he was identified to have a subarachnoid hemorrhage please see their note for further details.  He was transferred here for neurosurgical services.  In route the patient remained stable.  Blood pressure is systolic 130s while on nicardipine infusion.    No other issues in transfer    Currently denies any chest pain or shortness of breath, no visual changes no numbness or tingling.  No abdominal pain.  He is not nauseous.  He does report that his headache is somewhat increasing slowly.    I have reviewed the Medications, Allergies, Past Medical and Surgical History, and Social History in the Epic system.    Review of Systems   10 point review of symptoms was performed and is negative except as noted above.     Physical Exam   BP: 133/89  Pulse: 97  Temp: 98.2  F (36.8  C)  Resp: 17  Weight: 86.2 kg (190 lb)      Physical Exam  GEN: Well appearing, non toxic, cooperative and conversant.   HEENT: The head is normocephalic and atraumatic. Pupils are equal round and reactive to light. Extraocular motions are intact. There is no facial swelling. The neck is nontender and supple.   CV: Regular rate and rhythm without murmurs rubs or gallops. 2+ radial pulses bilaterally.  PULM: Clear to auscultation bilaterally.  ABD: Soft, nontender, nondistended.   EXT: Full range of motion.  No edema.  NEURO: Cranial nerves II through XII are intact and symmetric. Bilateral upper and lower extremities grossly show full range of motion without any focal deficits.  Median ulnar and radial nerve is 5/5 sensory intact light touch bilaterally.  EHL, FHL, TA 5/5 sensory intact light touch.  Finger-nose-finger intact and symmetric bilaterally  SKIN: No rashes, ecchymosis, or lacerations  PSYCH: Calm and cooperative, interactive.     ED Course         Procedures        Recent Results (from the past 24 hour(s))   Head CT w/o contrast    Narrative    EXAM: CT HEAD W/O CONTRAST, CTA  HEAD NECK WITH CONTRAST  LOC[facility]  DATE/TIME: 12/5/2019 11:37 PM    INDICATION: Severe headache after intercourse.  COMPARISON: None.  CONTRAST: 70mL Isovue-370  TECHNIQUE: Head and neck CT angiogram with IV contrast. Noncontrast head CT followed by axial helical CT images of the head and neck vessels obtained during the arterial phase of intravenous contrast administration. Axial 2D reconstructed images and   multiplanar 3D MIP reconstructed images of the head and neck vessels were performed by the technologist. Dose reduction techniques were used.     FINDINGS:   NONCONTRAST HEAD CT:   INTRACRANIAL CONTENTS: Relatively small volume acute subarachnoid hemorrhage in the prepontine cistern and premedullary cistern extending into the upper C6 spinal canal anteriorly along the dorsal clivus. No CT evidence of acute infarct. Normal   parenchymal attenuation. Normal ventricles and sulci.     VISUALIZED ORBITS/SINUSES/MASTOIDS: No intraorbital abnormality. Mild mucosal thickening scattered about the paranasal sinuses. No middle ear or mastoid effusion.    BONES/SOFT TISSUES: Expansile heterogeneous lesion at the superior right orbital rim measuring 20 x 17 x 14 mm (TR by AP by CC).    HEAD CTA:  ANTERIOR CIRCULATION: No stenosis/occlusion, aneurysm, or high flow vascular malformation. Standard [place] anatomy.    POSTERIOR CIRCULATION: No stenosis/occlusion, aneurysm, or high flow vascular malformation. Dominant left and smaller right vertebral artery contribute to a normal basilar artery.     DURAL VENOUS SINUSES: Expected enhancement of the major dural venous sinuses.    NECK CTA:  RIGHT CAROTID: No measurable stenosis or dissection.    LEFT CAROTID: No measurable stenosis or dissection.    VERTEBRAL ARTERIES: No focal stenosis or dissection. Dominant  left and smaller right vertebral arteries.    AORTIC ARCH: Classic aortic arch anatomy with no significant stenosis at the origin of the great vessels.    NONVASCULAR STRUCTURES: Unremarkable.      Impression    IMPRESSION:   HEAD CT:  1.  Small volume acute subarachnoid hemorrhage in the prepontine and premedullary cisterns extending into the ventral upper spinal canal.  2.  Benign-appearing expansile lesion within the superior right orbital rim, possibly representing a meningioma or fibrous dysplasia.    HEAD CTA:   1.  No branch vessel occlusion, high-grade stenosis, aneurysm, or high flow vascular malformation involving proximal [place] vessels.    NECK CTA:  1.  No significant stenosis in the neck vessels based on NASCET criteria.  2.  No evidence for dissection.    Exam discussed with Dr. Montano at 12:40 AM   CTA Head Neck with Contrast    Narrative    EXAM: CT HEAD W/O CONTRAST, CTA  HEAD NECK WITH CONTRAST  LOCATION: [facility]  DATE/TIME: 12/5/2019 11:37 PM    INDICATION: Severe headache after intercourse.  COMPARISON: None.  CONTRAST: 70mL Isovue-370  TECHNIQUE: Head and neck CT angiogram with IV contrast. Noncontrast head CT followed by axial helical CT images of the head and neck vessels obtained during the arterial phase of intravenous contrast administration. Axial 2D reconstructed images and   multiplanar 3D MIP reconstructed images of the head and neck vessels were performed by the technologist. Dose reduction techniques were used.     FINDINGS:   NONCONTRAST HEAD CT:   INTRACRANIAL CONTENTS: Relatively small volume acute subarachnoid hemorrhage in the prepontine cistern and premedullary cistern extending into the upper C6 spinal canal anteriorly along the dorsal clivus. No CT evidence of acute infarct. Normal   parenchymal attenuation. Normal ventricles and sulci.     VISUALIZED ORBITS/SINUSES/MASTOIDS: No intraorbital abnormality. Mild mucosal thickening scattered about the  paranasal sinuses. No middle ear or mastoid effusion.    BONES/SOFT TISSUES: Expansile heterogeneous lesion at the superior right orbital rim measuring 20 x 17 x 14 mm (TR by AP by CC).    HEAD CTA:  ANTERIOR CIRCULATION: No stenosis/occlusion, aneurysm, or high flow vascular malformation. Standard [place] anatomy.    POSTERIOR CIRCULATION: No stenosis/occlusion, aneurysm, or high flow vascular malformation. Dominant left and smaller right vertebral artery contribute to a normal basilar artery.     DURAL VENOUS SINUSES: Expected enhancement of the major dural venous sinuses.    NECK CTA:  RIGHT CAROTID: No measurable stenosis or dissection.    LEFT CAROTID: No measurable stenosis or dissection.    VERTEBRAL ARTERIES: No focal stenosis or dissection. Dominant left and smaller right vertebral arteries.    AORTIC ARCH: Classic aortic arch anatomy with no significant stenosis at the origin of the great vessels.    NONVASCULAR STRUCTURES: Unremarkable.      Impression    IMPRESSION:   HEAD CT:  1.  Small volume acute subarachnoid hemorrhage in the prepontine and premedullary cisterns extending into the ventral upper spinal canal.  2.  Benign-appearing expansile lesion within the superior right orbital rim, possibly representing a meningioma or fibrous dysplasia.    HEAD CTA:   1.  No branch vessel occlusion, high-grade stenosis, aneurysm, or high flow vascular malformation involving proximal [place] vessels.    NECK CTA:  1.  No significant stenosis in the neck vessels based on NASCET criteria.  2.  No evidence for dissection.    Exam discussed with Dr. Montano at 12:40 AM            Labs Ordered and Resulted from Time of ED Arrival Up to the Time of Departure from the ED - No data to display    Consults  Neurosurgery: Responded (12/06/19 4921)    Critical care: 35 minutes of critical care including organizing and discussing transportation and logistics from the outside facility, consultation with  neurosurgery, continuation of nicardipine infusion with frequent neuro assessment and ultimate admission to the SICU for this life-threatening subarachnoid hemorrhage      Assessments & Plan (with Medical Decision Making)   44-year-old male with acute subarachnoid hemorrhage as noted  Currently hemodynamically stable, received following transportation from [facility] emergency department    -Close frequent neurological assessment  -Continue nicardipine with goal systolic blood pressure less than 140  -Type and screen for [facility] screening sent in case needed.  -No indication for antiepileptics  -Admit to SICU        I have reviewed the nursing notes.    I have reviewed the findings, diagnosis, plan and need for follow up with the patient.    New Prescriptions    No medications on file       Final diagnoses:   Acute spontaneous subarachnoid intracranial hemorrhage (H)       12/6/2019   [facility], [place], EMERGENCY DEPARTMENT     Cedrick Garzon MD  12/07/19 0457

## 2019-12-06 NOTE — H&P
[facility]       NEUROSURGERY H&P NOTE    HPI:  Mr. Harmon is a 43 yo M with PMH asthma, HTN, DM2 transferred from [facility] to [facility] after post-coital severe headache, photosensitivity, nausea/vomiting, found to have small prepontine, premedullary cistern subarachnoid hemorrhage, extending into the ventral upper spinal canal. Patient states he had headache after intercourse around 2100 on 12/5, radiating to the posterior neck, took some ibuprofen, and went to the ED after persistent headache. Has remained neurologically intact, no weakness, numbness/tingling. CTH, CTA head/neck show no aneurysm or vessel abnormality, or hydrocephalus. Started on nicardipine gtt to keep SBP < 140. Does not take any blood thinners.    PAST MEDICAL HISTORY:   Past Medical History:   Diagnosis Date     Asthma      SAH (subarachnoid hemorrhage) 12/05/2019    during intercourse     Type 2 diabetes mellitus        PAST SURGICAL HISTORY: History reviewed. No pertinent surgical history.    FAMILY HISTORY:   Family History   Problem Relation Age of Onset     Cancer Mother         leukemia     Diabetes Father      Hypertension Father      Cancer Brother         leukemia        SOCIAL HISTORY:   Social History     Tobacco Use     Smoking status: Current Some Day Smoker     Packs/day: 0.25     Types: Cigarettes     Smokeless tobacco: Current User   Substance Use Topics     Alcohol use: Yes     Comment: very rare   Lives in [place]. 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    MEDICATIONS:  Current Outpatient Medications   Medication Sig Dispense Refill     ALBUTEROL IN        Atorvastatin Calcium (LIPITOR PO)        Beclomethasone Dipropionate (QVAR IN)        Insulin Glargine (LANTUS SC)        LISINOPRIL PO        METFORMIN HCL PO          Allergies:  No Known Allergies    ROS: 10 point ROS of systems including Constitutional, Eyes, Respiratory, Cardiovascular, Gastroenterology,  Genitourinary, Integumentary, Muscularskeletal, Psychiatric were all negative except for pertinent positives noted in my HPI.    Physical exam:   Blood pressure (!) 142/85, pulse 100, temperature 98.2  F (36.8  C), temperature source Oral, resp. rate 18, weight 86.2 kg (190 lb), SpO2 100 %.  CV: Rate as above  PULM: breathing comfortably on room air  NEUROLOGIC:  -- Awake; Alert; oriented x 3  -- Follows commands briskly  -- Speech fluent, spontaneous. No aphasia or dysarthria.  -- no gaze preference. No apparent hemineglect.  Cranial Nerves:  -- visual fields full to confrontation, PERRL 3-2mm bilat and brisk, extraocular movements intact  -- face symmetrical, tongue midline  -- sensory V1-V3 intact bilaterally  -- palate elevates symmetrically, uvula midline  -- hearing grossly intact bilat  -- Trapezii 5/5 strength bilat symmetric  -- Cerebellar: Finger nose finger without dysmetria    Motor:  Normal bulk / tone; no tremor, rigidity, or bradykinesia.  No muscle wasting or fasciculations  No Pronator Drift     Delt Bi Tri Hand Flexion/  Extension Iliopsoas Quadriceps Hamstrings Tibialis Anterior Gastroc    C5 C6 C7 C8/T1 L2 L3 L4-S1 L4 S1   R 5 5 5 5 5 5 5 5 5   L 5 5 5 5 5 5 5 5 5   Sensory:  intact to LT x 4 extremities     Reflexes:     Bi Tri BR Jackeline Pat Ach Bab    C5-6 C7-8 C6 UMN L2-4 S1 UMN   R 2+ 2+ 2+ Norm 2+ 2+ Norm   L 2+ 2+ 2+ Norm 2+ 2+ Norm     Gait: deferred      IMAGING:  CT head, CTA head/neck 12/5:  HEAD CT:  1.  Small volume acute subarachnoid hemorrhage in the prepontine and premedullary cisterns extending into the ventral upper spinal canal.  2.  Benign-appearing expansile lesion within the superior right orbital rim, possibly representing a meningioma or fibrous dysplasia.     HEAD CTA:   1.  No branch vessel occlusion, high-grade stenosis, aneurysm, or high flow vascular malformation involving proximal [place] vessels.     NECK CTA:  1.  No significant stenosis in the neck vessels  based on NASCET criteria.  2.  No evidence for dissection.     LABS:   plt 223, INR 0.95, Na 135    ASSESSMENT:  Mr. Harmon is a 45 yo M with PMH asthma, HTN, DM2 transferred from [facility] to [facility] after post-coital severe headache, photosensitivity, nausea/vomiting, found to have small prepontine, premedullary cistern subarachnoid hemorrhage, extending into the ventral upper spinal canal. Chen Carson 1, modified Amaya 1. Neurologically intact. CTA head/neck with no aneurysm or vascular malformation identified.    RECOMMENDATIONS:  - Admit to neurosurgery on 4A  - nicardipine gtt prn to keep SBP < 140  - Plan for angiogram 12/6 in AM  - repeat CT head in 6 hrs (12/6 at 6AM)  - TCD, nimodipine  - NPO except meds  - cervical MRI    The patient was discussed with Dr. Leschke, neurosurgery chief resident, and Dr. Altamirano, neurosurgery attending, and they agree with the above.    Clemente Vidales MD  Neurosurgery Resident PGY2

## 2019-12-06 NOTE — CONSULTS
[facility], [place]    Stroke Consult Note    Reason for Consult:  Subarachnoid hemorrhage    Chief Complaint: Headache       HPI  Vanesa Harmon is a 44 year old male with history of hypertension, type 2 diabetes, asthma, and hyperlipidemia who is transferred from [facility] with subarachnoid hemorrhage.  Yesterday evening after having intercourse at around 2100 he developed sudden onset severe headache with associated  photosensitivity. The pain radiated to the posterior neck. He became dizziness. He took some ibuprofen and presented to the ED when the headache persisted. He had nausea/vomiting in the ED. Noncontrast head CT at [facility] revealed subarachnoid blood in the pre-pontine and premedullary cisterns extending to the ventral upper spinal cord.  He does not take any blood thinners and does not have any history of bleeding.  No significant family history for stroke or hemorrhage.  No aneurysm was identified on CT angiogram. Head currently 3/10 in severity. Nausea resolved.    Impression  Subarachnoid Hemorrhage   44 year old male with history of hypertension, type 2 diabetes, asthma, and hyperlipidemia who is transferred from [facility] with subarachnoid hemorrhage. HH1, mF1. Patient is nonfocal on examination. No evidence of aneurysm on CTA head/neck, though formal angiogram in the morning will fully assess for this.    Recommendations  Neuro:  Subarachnoid Hemorrhage Stroke Recommendations  - BP Goal:  < 140 systolic  - Follow up head CT 4 hours  - Plan for head CT in AM  - Nimodipine until aneurysmal cause ruled out   - Daily TCDS  - Head of bed elevated  - Telemetry, EKG  - Bedside Glucose Monitoring  - Labs: INR, PTT, fibrinogen, CBC, BMP, liver panel  - Euthermia, Euglycemia    Resp:  No acute issues    CV:  #HTN  -Holding home lisinopril  -Nicaripine drip  -SBP < 140    Renal:  #No acute issues  -Daily BMP    Endo:  #Diabetes  -Hold home metformin  -Hold  home glargine  -Medium dose SSI    Heme:  #No acute issues    Transfuse PLT<100, HGB <8    GI:  #No issues issues  -NPO    ID:  #No acute issues      Patient Follow-up    - final recommendation pending work-up    Thank you for this consult. We will continue to follow.     The patient was discussed with Stroke Fellow, Dr. Manzo.  The Stroke Staff is Dr. Crenshaw.    Ruben Burleson MD  Neurology Resident  Pager:  [phone]  _____________________________________________________    Past Medical History   Past Medical History:   Diagnosis Date     Asthma      SAH (subarachnoid hemorrhage) 12/05/2019    during intercourse     Type 2 diabetes mellitus      Past Surgical History   History reviewed. No pertinent surgical history.  Medications   Home Meds  Prior to Admission medications    Medication Sig Start Date End Date Taking? Authorizing Provider   ALBUTEROL IN     Reported, Patient   Atorvastatin Calcium (LIPITOR PO)     Reported, Patient   Beclomethasone Dipropionate (QVAR IN)     Reported, Patient   Insulin Glargine (LANTUS SC)     Reported, Patient   LISINOPRIL PO     Reported, Patient   METFORMIN HCL PO     Reported, Patient       Scheduled Meds  -Nimodipine   -Miralax  -Senokot    Infusion Meds  -Nicardipine    PRN Meds  -Insulin aspart    Allergies   No Known Allergies  Family History   Family History   Problem Relation Age of Onset     Cancer Mother         leukemia     Diabetes Father      Hypertension Father      Cancer Brother         leukemia      Social History   Social History     Tobacco Use     Smoking status: Current Some Day Smoker     Packs/day: 0.25     Types: Cigarettes     Smokeless tobacco: Current User   Substance Use Topics     Alcohol use: Yes     Comment: very rare     Drug use: No       Review of Systems   Denies chest pain, shortness of breath, rash, vision change, dysuria. Nausea resolved.       PHYSICAL EXAMINATION   Temp:  [97.1  F (36.2  C)-98.2  F (36.8  C)] 98.2  F (36.8  C)  Pulse:   [] 91  Heart Rate:  [89-98] 94  Resp:  [15-18] 15  BP: (133-144)/(78-89) 136/78  SpO2:  [97 %-100 %] 98 %    Neurologic  Mental Status:  alert, oriented x 3, follows commands, speech clear and fluent, naming and repetition normal  Cranial Nerves:  visual fields intact, PERRL, EOMI with normal smooth pursuit, facial sensation intact and symmetric, facial movements symmetric, hearing not formally tested but intact to conversation, palate elevation symmetric and uvula midline, no dysarthria, shoulder shrug strong bilaterally, tongue protrusion midline  Motor:  normal muscle tone and bulk, no abnormal movements, able to move all limbs spontaneously, strength 5/5 throughout upper and lower extremities  Reflexes:  toes down-going  Sensory:  light touch sensation intact and symmetric throughout upper and lower extremities  Coordination:  normal finger-to-nose and heel-to-shin bilaterally without dysmetria  Station/Gait:  deferred    Dysphagia Screen  Per Nursing    Stroke Scales    Hunt and Carson Scale (at arrival)  Grade             Imaging  I personally reviewed all imaging; relevant findings per HPI.    Labs Data   CBC  Recent Labs   Lab 12/05/19  2243   WBC 12.4*   RBC 5.42   HGB 12.8*   HCT 41.5        Basic Metabolic Panel   Recent Labs   Lab 12/05/19  2243      POTASSIUM 3.7   CHLORIDE 101   CO2 27   BUN 14   CR 0.68   *   VEL 8.8     Liver Panel  Recent Labs   Lab Test 02/05/18  1457 06/19/14  0947 03/30/13  1107 02/26/13  1003   PROTTOTAL 8.7 8.0  --  8.6   ALBUMIN 3.9 4.3  --  4.3   BILITOTAL 1.0 0.4  --  0.2   ALKPHOS 123 101  --  102   AST 53* 37 49* 53*   * 53 71* 101*     INR  Recent Labs   Lab Test 12/05/19  2243   INR 0.95      Lipid Profile  Recent Labs   Lab Test 03/30/13  1107   CHOL 122   HDL 23*   LDL 84   TRIG 75   CHOLHDLRATIO 5.4*     A1C  Recent Labs   Lab Test 06/19/14  0947   A1C 6.4*     Troponin Tiera results for input(s): TROPI in the last 168 hours.      IMPRESSION:   HEAD CT:  1.  Small volume acute subarachnoid hemorrhage in the prepontine and premedullary cisterns extending into the ventral upper spinal canal.  2.  Benign-appearing expansile lesion within the superior right orbital rim, possibly representing a meningioma or fibrous dysplasia.     HEAD CTA:   1.  No branch vessel occlusion, high-grade stenosis, aneurysm, or high flow vascular malformation involving proximal [place] of [place] vessels.     NECK CTA:  1.  No significant stenosis in the neck vessels based on NASCET criteria.  2.  No evidence for dissection.      Stroke Code / Stroke Consult Data Data This was a non-emergent, non-tele stroke consult.

## 2019-12-06 NOTE — ED TRIAGE NOTES
Pt BIBA from [facility]. After intercourse, pt experienced HA, nausea and light sensitive around 2000. Took 2 Ibuprofen's with no relief. Went to [facility] for evaluation. CT showed subarachnoid, no neuro deficits, Nicardipine drip started- currently at 2.5. 2 18G IV bilaterally. Zofran and morphine given 2x. Pt states his headache is improving and nausea is much better.

## 2019-12-06 NOTE — ED NOTES
[facility]   ED Nurse to Floor Handoff     Vanesa Harmon is a 44 year old male who speaks English and lives with family members,  in a home  They arrived in the ED by ambulance from emergency room    ED Chief Complaint: Headache    ED Dx;   Final diagnoses:   Acute spontaneous subarachnoid intracranial hemorrhage (H)         Needed?: No    Allergies: No Known Allergies.  Past Medical Hx:   Past Medical History:   Diagnosis Date     Asthma      SAH (subarachnoid hemorrhage) 12/05/2019    during intercourse     Type 2 diabetes mellitus       Baseline Mental status: WDL  Current Mental Status changes: at basesline    Infection present or suspected this encounter: no  Sepsis suspected: No  Isolation type: No active isolations     Activity level - Baseline/Home:  Independent  Activity Level - Current:   Independent    Bariatric equipment needed?: No    In the ED these meds were given: Medications - No data to display    Drips running?  Yes Nicardepine drip currently at 2.5    Home pump  No    Current LDAs  Peripheral IV 12/06/19 Right Upper forearm (Active)   Site Assessment [facility] 12/6/2019  2:11 AM   Number of days: 0       Peripheral IV 12/06/19 Left Upper forearm (Active)   Number of days: 0       Labs results:   Labs Ordered and Resulted from Time of ED Arrival Up to the Time of Departure from the ED   GLUCOSE BY METER - Abnormal; Notable for the following components:       Result Value    Glucose 292 (*)     All other components within normal limits       Imaging Studies:   Recent Results (from the past 24 hour(s))   Head CT w/o contrast    Narrative    EXAM: CT HEAD W/O CONTRAST, CTA  HEAD NECK WITH CONTRAST  LOCATION: [facility]  DATE/TIME: 12/5/2019 11:37 PM    INDICATION: Severe headache after intercourse.  COMPARISON: None.  CONTRAST: 70mL Isovue-370  TECHNIQUE: Head and neck CT angiogram with IV contrast. Noncontrast head CT followed by axial  helical CT images of the head and neck vessels obtained during the arterial phase of intravenous contrast administration. Axial 2D reconstructed images and   multiplanar 3D MIP reconstructed images of the head and neck vessels were performed by the technologist. Dose reduction techniques were used.     FINDINGS:   NONCONTRAST HEAD CT:   INTRACRANIAL CONTENTS: Relatively small volume acute subarachnoid hemorrhage in the prepontine cistern and premedullary cistern extending into the upper C6 spinal canal anteriorly along the dorsal clivus. No CT evidence of acute infarct. Normal   parenchymal attenuation. Normal ventricles and sulci.     VISUALIZED ORBITS/SINUSES/MASTOIDS: No intraorbital abnormality. Mild mucosal thickening scattered about the paranasal sinuses. No middle ear or mastoid effusion.    BONES/SOFT TISSUES: Expansile heterogeneous lesion at the superior right orbital rim measuring 20 x 17 x 14 mm (TR by AP by CC).    HEAD CTA:  ANTERIOR CIRCULATION: No stenosis/occlusion, aneurysm, or high flow vascular malformation. Standard [place] of [place] anatomy.    POSTERIOR CIRCULATION: No stenosis/occlusion, aneurysm, or high flow vascular malformation. Dominant left and smaller right vertebral artery contribute to a normal basilar artery.     DURAL VENOUS SINUSES: Expected enhancement of the major dural venous sinuses.    NECK CTA:  RIGHT CAROTID: No measurable stenosis or dissection.    LEFT CAROTID: No measurable stenosis or dissection.    VERTEBRAL ARTERIES: No focal stenosis or dissection. Dominant left and smaller right vertebral arteries.    AORTIC ARCH: Classic aortic arch anatomy with no significant stenosis at the origin of the great vessels.    NONVASCULAR STRUCTURES: Unremarkable.      Impression    IMPRESSION:   HEAD CT:  1.  Small volume acute subarachnoid hemorrhage in the prepontine and premedullary cisterns extending into the ventral upper spinal canal.  2.  Benign-appearing expansile lesion  within the superior right orbital rim, possibly representing a meningioma or fibrous dysplasia.    HEAD CTA:   1.  No branch vessel occlusion, high-grade stenosis, aneurysm, or high flow vascular malformation involving proximal [place] of [place] vessels.    NECK CTA:  1.  No significant stenosis in the neck vessels based on NASCET criteria.  2.  No evidence for dissection.    Exam discussed with Dr. Montano at 12:40 AM   CTA Head Neck with Contrast    Narrative    EXAM: CT HEAD W/O CONTRAST, CTA  HEAD NECK WITH CONTRAST  LOCATION: [facility]  DATE/TIME: 12/5/2019 11:37 PM    INDICATION: Severe headache after intercourse.  COMPARISON: None.  CONTRAST: 70mL Isovue-370  TECHNIQUE: Head and neck CT angiogram with IV contrast. Noncontrast head CT followed by axial helical CT images of the head and neck vessels obtained during the arterial phase of intravenous contrast administration. Axial 2D reconstructed images and   multiplanar 3D MIP reconstructed images of the head and neck vessels were performed by the technologist. Dose reduction techniques were used.     FINDINGS:   NONCONTRAST HEAD CT:   INTRACRANIAL CONTENTS: Relatively small volume acute subarachnoid hemorrhage in the prepontine cistern and premedullary cistern extending into the upper C6 spinal canal anteriorly along the dorsal clivus. No CT evidence of acute infarct. Normal   parenchymal attenuation. Normal ventricles and sulci.     VISUALIZED ORBITS/SINUSES/MASTOIDS: No intraorbital abnormality. Mild mucosal thickening scattered about the paranasal sinuses. No middle ear or mastoid effusion.    BONES/SOFT TISSUES: Expansile heterogeneous lesion at the superior right orbital rim measuring 20 x 17 x 14 mm (TR by AP by CC).    HEAD CTA:  ANTERIOR CIRCULATION: No stenosis/occlusion, aneurysm, or high flow vascular malformation. Standard [place] of [place] anatomy.    POSTERIOR CIRCULATION: No stenosis/occlusion, aneurysm, or high flow vascular  malformation. Dominant left and smaller right vertebral artery contribute to a normal basilar artery.     DURAL VENOUS SINUSES: Expected enhancement of the major dural venous sinuses.    NECK CTA:  RIGHT CAROTID: No measurable stenosis or dissection.    LEFT CAROTID: No measurable stenosis or dissection.    VERTEBRAL ARTERIES: No focal stenosis or dissection. Dominant left and smaller right vertebral arteries.    AORTIC ARCH: Classic aortic arch anatomy with no significant stenosis at the origin of the great vessels.    NONVASCULAR STRUCTURES: Unremarkable.      Impression    IMPRESSION:   HEAD CT:  1.  Small volume acute subarachnoid hemorrhage in the prepontine and premedullary cisterns extending into the ventral upper spinal canal.  2.  Benign-appearing expansile lesion within the superior right orbital rim, possibly representing a meningioma or fibrous dysplasia.    HEAD CTA:   1.  No branch vessel occlusion, high-grade stenosis, aneurysm, or high flow vascular malformation involving proximal [place] of [place] vessels.    NECK CTA:  1.  No significant stenosis in the neck vessels based on NASCET criteria.  2.  No evidence for dissection.    Exam discussed with Dr. Montano at 12:40 AM       Recent vital signs:   BP (!) 141/88   Pulse 90   Temp 98.2  F (36.8  C) (Oral)   Resp 14   Wt 86.2 kg (190 lb)   SpO2 98%   BMI 29.32 kg/m      Maxx Coma Scale Score: 15 (12/06/19 0315)  Assessment Qualifiers: patient not sedated/intubated    Cardiac Rhythm: Normal Sinus  Pt needs tele? Yes  Skin/wound Issues: None    Code Status: Full Code    Pain control: fair    Nausea control: good    Abnormal labs/tests/findings requiring intervention: CT resulted withSubarachnoid     Family present during ED course? Yes   Family Comments/Social Situation comments: Wife at bedside    Tasks needing completion: None    Shelby Dennis, RN  [phone]

## 2019-12-06 NOTE — PHARMACY-ADMISSION MEDICATION HISTORY
Admission medication history interview status for the 12/6/2019 admission is complete. See Epic admission navigator for allergy information, pharmacy, prior to admission medications and immunization status.     Medication history interview sources:  [place], family, home pharmacy ([facility]), [place]    Changes made to PTA medication list (reason)  Added:   -glipizide 2.5 mg tablet (per pharmacy)  Deleted:   -Qvar inhaler (patient reports no longer using)  Changed:   -albuterol inhaler (no directions) --> albuterol 108 mcg/act: inhale 2 puffs into the lungs QID PRN for shortness of breath/dyspnea, or wheezing  -atorvastatin (no directions) --> atorvastatin 20 mg tablet: take 20 mg PO daily (per  Pharmacy)  -insulin glargine (no directions) --> Basaglar Kwikpen 100 unit/mL: inject 11 units every evening (per patient report; verified by  Pharmacy)  -lisinopril PO (no directions) --> lisinopril 10 mg tablet: take 1 tablet by mouth daily  -metformin HCl PO (no directions) --> metformin 1000 mg IR tablets: take 1 tablet by mouth BID with meals (per [facility])    Additional medication history information (including reliability of information, actions taken by pharmacist):  -Patient did not know the names of many of his medications (besides his insulin and metformin) so his pharmacy was called. Per the pharmacy, the patient last filled a 30 day supply of atorvastatin and lisinopril in September 2019 and a 30 day supply of glipizide in August of 2019.     -The patient was able to verbally verify his insulin glargine dose and confirmed that he received his metformin & insulin two nights ago (12/4).      Prior to Admission medications    Medication Sig Last Dose Taking? Auth Provider   albuterol (PROAIR HFA/PROVENTIL HFA/VENTOLIN HFA) 108 (90 Base) MCG/ACT inhaler Inhale 2 puffs into the lungs 4 times daily as needed for shortness of breath / dyspnea or wheezing Verified at Unknown time Yes Unknown,  Entered By History   atorvastatin (LIPITOR) 20 MG tablet Take 20 mg by mouth daily Verified per home Pharmacy at Last filled in September 2019 for 30 ds Yes Unknown, Entered By History   glipiZIDE (GLUCOTROL XL) 2.5 MG 24 hr tablet Take 1 tablet by mouth daily Verified per home Pharmacy at Last filled August of 2019 for 30 day supply Yes Unknown, Entered By History   insulin glargine (BASAGLAR KWIKPEN) 100 UNIT/ML pen Inject 11 Units Subcutaneous every evening 12/4/2019 at PM Yes Unknown, Entered By History   lisinopril (PRINIVIL/ZESTRIL) 10 MG tablet Take 1 tablet by mouth daily Verified per home Pharmacy at Last filled in September for a 30 day supply Yes Unknown, Entered By History   metFORMIN (GLUCOPHAGE) 1000 MG tablet Take 1 tablet by mouth 2 times daily (with meals) 12/4/2019 at PM Yes Unknown, Entered By History         Medication history completed by: Jaswant Laguna, PharmD IV Student

## 2019-12-06 NOTE — PROGRESS NOTES
12/06/19 1002   General Information   Onset Date 12/06/19   Start of Care Date 12/06/19   Referring Physician Clemente Vidales MD   Patient/Family Goals Statement To eat and drink   Swallowing Evaluation Bedside swallow evaluation   Behaviorial Observations WFL (within functional limits)   Mode of current nutrition NPO   Respiratory Status Room air   Comments Pt is a 44 year old male with history of hypertension, type 2 diabetes, asthma, and hyperlipidemia who is transferred from [facility] with subarachnoid hemorrhage. Noncontrast head CT at [facility] revealed subarachnoid blood in the pre-pontine and premedullary cisterns extending to the ventral upper spinal cord.  Pt w/o hx of swallowing, speech, language difficulty PTA per pt report or chart review. Per pt report and informal observation, no aphasia or dysarthria present.  Clinical swallow eval completed per MD order.   Clinical Swallow Evaluation   Oral Musculature generally intact   Structural Abnormalities none present   Dentition present and adequate   Mucosal Quality good   Mandibular Strength and Mobility intact   Oral Labial Strength and Mobility WFL   Lingual Strength and Mobility WFL   Velar Elevation intact   Buccal Strength and Mobility intact   Laryngeal Function Cough;Throat clear;Voicing initiated;Swallow  (WFL)   Oral Musculature Comments Oral mech unremarkable   Additional Documentation Yes   Swallow Eval   Feeding Assistance set up only required   Clinical Swallow Eval: Thin Liquid Texture Trial   Mode of Presentation, Thin Liquids straw;cup;self-fed;fed by clinician   Volume of Liquid or Food Presented 4 oz   Oral Phase of Swallow WFL   Pharyngeal Phase of Swallow intact   Diagnostic Statement No overt s/sx of aspiration, oral phase WFL   Clinical Swallow Eval: Puree Solid Texture Trial   Mode of Presentation, Puree spoon;self-fed;fed by clinician   Volume of Puree Presented 3 TBSP   Oral Phase, Puree WFL   Pharyngeal Phase, Puree  intact   Diagnostic Statement No overt s/sx of aspiration, oral phase WFL   Clinical Swallow Eval: Solid Food Texture Trial   Mode of Presentation, Solid self-fed   Volume of Solid Food Presented 2 crackers   Oral Phase, Solid WFL   Pharyngeal Phase, Solid intact   Diagnostic Statement No overt s/sx of aspiration, oral phase WFL   Swallow Compensations   Swallow Compensations No compensations were used   Esophageal Phase of Swallow   Patient reports or presents with symptoms of esophageal dysphagia No   General Therapy Interventions   Planned Therapy Interventions Dysphagia Treatment   Dysphagia treatment Instruction of safe swallow strategies   Intervention Comments short course for PO tolerance   Swallow Eval: Clinical Impressions   Skilled Criteria for Therapy Intervention Skilled criteria met.  Treatment indicated.   Functional Assessment Scale (FAS) 7   Treatment Diagnosis Functional oropharyngeal swallowing mechanism   Diet texture recommendations Regular diet;Thin liquids   Recommended Feeding/Eating Techniques alternate between small bites and sips of food/liquid;maintain upright posture during/after eating for 30 mins;small sips/bites   Demonstrates Need for Referral to Another Service occupational therapy;physical therapy   Therapy Frequency 5x/week   Predicted Duration of Therapy Intervention (days/wks) 1 week   Anticipated Discharge Disposition home   Risks and Benefits of Treatment have been explained. Yes   Patient, family and/or staff in agreement with Plan of Care Yes   Clinical Impression Comments Clinical swallow eval completed per MD order.  Pt presents with a functional oropharyngeal swallowing mechanism in the setting of subarachnoid hemorrhage.  Recommend regular diet and thin liquids.  Ensure pt is fully alert and upright for all PO, given small bites/sips, alternating bites/sips.  Pt awake and alert.  Oral mech unremarkable.  No overt s/sx of aspiration with thin via cup/straw, puree, solid  texture.  Oral phase WFL across consistencies.  Pt following safe swallow precautions w/o cues.  SLP to follow for short course for PO tolerance.   Total Evaluation Time   Total Evaluation Time (Minutes) 10

## 2019-12-07 ENCOUNTER — APPOINTMENT (OUTPATIENT)
Dept: ULTRASOUND IMAGING | Facility: CLINIC | Age: 44
DRG: 066 | End: 2019-12-07
Payer: COMMERCIAL

## 2019-12-07 LAB
ANION GAP SERPL CALCULATED.3IONS-SCNC: 5 MMOL/L (ref 3–14)
BASOPHILS # BLD AUTO: 0 10E9/L (ref 0–0.2)
BASOPHILS NFR BLD AUTO: 0.4 %
BUN SERPL-MCNC: 16 MG/DL (ref 7–30)
CALCIUM SERPL-MCNC: 8.2 MG/DL (ref 8.5–10.1)
CHLORIDE SERPL-SCNC: 102 MMOL/L (ref 94–109)
CO2 SERPL-SCNC: 27 MMOL/L (ref 20–32)
CREAT SERPL-MCNC: 0.74 MG/DL (ref 0.66–1.25)
DIFFERENTIAL METHOD BLD: ABNORMAL
EOSINOPHIL # BLD AUTO: 0.7 10E9/L (ref 0–0.7)
EOSINOPHIL NFR BLD AUTO: 6.9 %
ERYTHROCYTE [DISTWIDTH] IN BLOOD BY AUTOMATED COUNT: 14.4 % (ref 10–15)
GFR SERPL CREATININE-BSD FRML MDRD: >90 ML/MIN/{1.73_M2}
GLUCOSE BLDC GLUCOMTR-MCNC: 189 MG/DL (ref 70–99)
GLUCOSE BLDC GLUCOMTR-MCNC: 225 MG/DL (ref 70–99)
GLUCOSE BLDC GLUCOMTR-MCNC: 225 MG/DL (ref 70–99)
GLUCOSE BLDC GLUCOMTR-MCNC: 234 MG/DL (ref 70–99)
GLUCOSE BLDC GLUCOMTR-MCNC: 245 MG/DL (ref 70–99)
GLUCOSE BLDC GLUCOMTR-MCNC: 260 MG/DL (ref 70–99)
GLUCOSE SERPL-MCNC: 227 MG/DL (ref 70–99)
HCT VFR BLD AUTO: 36.6 % (ref 40–53)
HGB BLD-MCNC: 11.4 G/DL (ref 13.3–17.7)
IMM GRANULOCYTES # BLD: 0 10E9/L (ref 0–0.4)
IMM GRANULOCYTES NFR BLD: 0.2 %
LYMPHOCYTES # BLD AUTO: 3.1 10E9/L (ref 0.8–5.3)
LYMPHOCYTES NFR BLD AUTO: 29.7 %
MAGNESIUM SERPL-MCNC: 2.1 MG/DL (ref 1.6–2.3)
MCH RBC QN AUTO: 23.3 PG (ref 26.5–33)
MCHC RBC AUTO-ENTMCNC: 31.1 G/DL (ref 31.5–36.5)
MCV RBC AUTO: 75 FL (ref 78–100)
MONOCYTES # BLD AUTO: 0.9 10E9/L (ref 0–1.3)
MONOCYTES NFR BLD AUTO: 8.2 %
NEUTROPHILS # BLD AUTO: 5.7 10E9/L (ref 1.6–8.3)
NEUTROPHILS NFR BLD AUTO: 54.6 %
NRBC # BLD AUTO: 0 10*3/UL
NRBC BLD AUTO-RTO: 0 /100
PHOSPHATE SERPL-MCNC: 3.2 MG/DL (ref 2.5–4.5)
PLATELET # BLD AUTO: 210 10E9/L (ref 150–450)
POTASSIUM SERPL-SCNC: 3.7 MMOL/L (ref 3.4–5.3)
RBC # BLD AUTO: 4.9 10E12/L (ref 4.4–5.9)
SODIUM SERPL-SCNC: 134 MMOL/L (ref 133–144)
WBC # BLD AUTO: 10.4 10E9/L (ref 4–11)

## 2019-12-07 PROCEDURE — 80048 BASIC METABOLIC PNL TOTAL CA: CPT | Performed by: STUDENT IN AN ORGANIZED HEALTH CARE EDUCATION/TRAINING PROGRAM

## 2019-12-07 PROCEDURE — 25000132 ZZH RX MED GY IP 250 OP 250 PS 637: Performed by: STUDENT IN AN ORGANIZED HEALTH CARE EDUCATION/TRAINING PROGRAM

## 2019-12-07 PROCEDURE — 83735 ASSAY OF MAGNESIUM: CPT | Performed by: STUDENT IN AN ORGANIZED HEALTH CARE EDUCATION/TRAINING PROGRAM

## 2019-12-07 PROCEDURE — 00000146 ZZHCL STATISTIC GLUCOSE BY METER IP

## 2019-12-07 PROCEDURE — 20000004 ZZH R&B ICU UMMC

## 2019-12-07 PROCEDURE — 25800030 ZZH RX IP 258 OP 636: Performed by: STUDENT IN AN ORGANIZED HEALTH CARE EDUCATION/TRAINING PROGRAM

## 2019-12-07 PROCEDURE — 84100 ASSAY OF PHOSPHORUS: CPT | Performed by: STUDENT IN AN ORGANIZED HEALTH CARE EDUCATION/TRAINING PROGRAM

## 2019-12-07 PROCEDURE — 25000132 ZZH RX MED GY IP 250 OP 250 PS 637: Performed by: NEUROLOGICAL SURGERY

## 2019-12-07 PROCEDURE — 85025 COMPLETE CBC W/AUTO DIFF WBC: CPT | Performed by: STUDENT IN AN ORGANIZED HEALTH CARE EDUCATION/TRAINING PROGRAM

## 2019-12-07 PROCEDURE — 36415 COLL VENOUS BLD VENIPUNCTURE: CPT | Performed by: STUDENT IN AN ORGANIZED HEALTH CARE EDUCATION/TRAINING PROGRAM

## 2019-12-07 PROCEDURE — 25000128 H RX IP 250 OP 636: Performed by: STUDENT IN AN ORGANIZED HEALTH CARE EDUCATION/TRAINING PROGRAM

## 2019-12-07 PROCEDURE — 93886 INTRACRANIAL COMPLETE STUDY: CPT

## 2019-12-07 RX ORDER — ALBUTEROL SULFATE 90 UG/1
2 AEROSOL, METERED RESPIRATORY (INHALATION) 4 TIMES DAILY PRN
Status: DISCONTINUED | OUTPATIENT
Start: 2019-12-07 | End: 2019-12-13 | Stop reason: HOSPADM

## 2019-12-07 RX ORDER — LISINOPRIL 10 MG/1
10 TABLET ORAL DAILY
Status: DISCONTINUED | OUTPATIENT
Start: 2019-12-08 | End: 2019-12-13 | Stop reason: HOSPADM

## 2019-12-07 RX ORDER — ATORVASTATIN CALCIUM 20 MG/1
20 TABLET, FILM COATED ORAL DAILY
Status: DISCONTINUED | OUTPATIENT
Start: 2019-12-08 | End: 2019-12-13 | Stop reason: HOSPADM

## 2019-12-07 RX ORDER — GLIPIZIDE 2.5 MG/1
2.5 TABLET, EXTENDED RELEASE ORAL DAILY
Status: DISCONTINUED | OUTPATIENT
Start: 2019-12-07 | End: 2019-12-07

## 2019-12-07 RX ORDER — ATORVASTATIN CALCIUM 20 MG/1
20 TABLET, FILM COATED ORAL DAILY
Status: DISCONTINUED | OUTPATIENT
Start: 2019-12-07 | End: 2019-12-07

## 2019-12-07 RX ORDER — INSULIN GLARGINE 100 [IU]/ML
11 INJECTION, SOLUTION SUBCUTANEOUS EVERY EVENING
Status: DISCONTINUED | OUTPATIENT
Start: 2019-12-07 | End: 2019-12-07

## 2019-12-07 RX ADMIN — SODIUM CHLORIDE: 9 INJECTION, SOLUTION INTRAVENOUS at 07:41

## 2019-12-07 RX ADMIN — SENNOSIDES AND DOCUSATE SODIUM 2 TABLET: 8.6; 5 TABLET ORAL at 07:41

## 2019-12-07 RX ADMIN — OXYCODONE HYDROCHLORIDE 5 MG: 5 TABLET ORAL at 16:07

## 2019-12-07 RX ADMIN — OXYCODONE HYDROCHLORIDE 5 MG: 5 TABLET ORAL at 19:50

## 2019-12-07 RX ADMIN — NIMODIPINE 60 MG: 30 CAPSULE, LIQUID FILLED ORAL at 19:49

## 2019-12-07 RX ADMIN — ATORVASTATIN CALCIUM 40 MG: 40 TABLET, FILM COATED ORAL at 19:50

## 2019-12-07 RX ADMIN — LABETALOL 20 MG/4 ML (5 MG/ML) INTRAVENOUS SYRINGE 20 MG: at 00:18

## 2019-12-07 RX ADMIN — NIMODIPINE 60 MG: 30 CAPSULE, LIQUID FILLED ORAL at 04:17

## 2019-12-07 RX ADMIN — NIMODIPINE 60 MG: 30 CAPSULE, LIQUID FILLED ORAL at 00:17

## 2019-12-07 RX ADMIN — SENNOSIDES AND DOCUSATE SODIUM 2 TABLET: 8.6; 5 TABLET ORAL at 19:49

## 2019-12-07 RX ADMIN — OXYCODONE HYDROCHLORIDE 5 MG: 5 TABLET ORAL at 07:41

## 2019-12-07 RX ADMIN — METFORMIN HYDROCHLORIDE 1000 MG: 500 TABLET ORAL at 22:12

## 2019-12-07 RX ADMIN — GLIPIZIDE 2.5 MG: 2.5 TABLET, FILM COATED, EXTENDED RELEASE ORAL at 07:41

## 2019-12-07 RX ADMIN — INSULIN ASPART 2 UNITS: 100 INJECTION, SOLUTION INTRAVENOUS; SUBCUTANEOUS at 22:41

## 2019-12-07 RX ADMIN — POLYETHYLENE GLYCOL 3350 17 G: 17 POWDER, FOR SOLUTION ORAL at 07:42

## 2019-12-07 RX ADMIN — NIMODIPINE 60 MG: 30 CAPSULE, LIQUID FILLED ORAL at 16:07

## 2019-12-07 RX ADMIN — NIMODIPINE 60 MG: 30 CAPSULE, LIQUID FILLED ORAL at 07:41

## 2019-12-07 RX ADMIN — ACETAMINOPHEN 650 MG: 325 TABLET, FILM COATED ORAL at 01:58

## 2019-12-07 RX ADMIN — NIMODIPINE 60 MG: 30 CAPSULE, LIQUID FILLED ORAL at 11:46

## 2019-12-07 RX ADMIN — OXYCODONE HYDROCHLORIDE 5 MG: 5 TABLET ORAL at 11:46

## 2019-12-07 ASSESSMENT — VISUAL ACUITY
OU: NORMAL ACUITY

## 2019-12-07 ASSESSMENT — ACTIVITIES OF DAILY LIVING (ADL)
ADLS_ACUITY_SCORE: 12

## 2019-12-07 ASSESSMENT — PAIN DESCRIPTION - DESCRIPTORS
DESCRIPTORS: HEADACHE

## 2019-12-07 ASSESSMENT — MIFFLIN-ST. JEOR: SCORE: 1788.05

## 2019-12-07 NOTE — PROGRESS NOTES
"Neuroscience Intensive Care Progress Note           Assessment and Plan         Current hospital day: Hospital Day: 2    Vanesa Harmon is a 44 year old-year-old gentleman with a history of diabetes who was admitted on 12/6/2019 with an angio-negative subarachnoid hemorrhage.     Neurologic:  #Angio-negative prepontine subarachnoid hemorrhage, HH1 mFS1  #Headache  - Daily TCDs  - Nimodipine 60 mg Q4H  - Neuro checks Q2H during day and Q4H at night   - Repeat angio in the future  - Pain control with Tylenol/Oxycodone  - SBP goal <140  - MRI of the cervical spine: await final report     Cardiovascular:   #Hypertension  - SBP goal <140  - Hydralazine as needed for BP goal   - Hold home lisinopril for now    #Hyperlipidemia  - Continue home atorvastatin 40 mg daily     Echo 12/6: EF of 60-65%. Relative wall thickness is increased consistent with concentric remodeling.     Pulmonary:  No active issues.   - PRN inhalers     Renal:  No active issues  - Stop IVF  - Daily BMP    Endocrine:  #Type II diabetes mellitus, uncontrolled   - Continue long acting insulin at half dose   - High intensity sliding scale insulin     Hematologic:  No active issues.   - Daily CBC    Gastrointestinal:  No active issues.   - Bowel regimen in place     Nutrition: General diet     Infectious diseases:   No active issues.     PPX:    DVT: SCDs     GI: Not indicated   Lines/Tubes: PIVs  Code Status: Full Code    Dispo: ICU    Evi Krause   Neuro ICU Fellow   Pager: [phone]             Subjective          Vanesa Harmon is a 44 year old-year-old gentleman with a history of diabetes who was admitted on 12/6/2019 with an angio-negative subarachnoid hemorrhage.     24 hour events:  Had a headache and upper back pain through the night.            24 Hour Vital Signs Summary        /88   Pulse 103   Temp 98.6  F (37  C) (Oral)   Resp 17   Ht 1.676 m (5' 6\")   Wt 94.5 kg (208 lb 6.4 oz)   SpO2 97%   BMI 33.64 kg/m           Ventilator " Settings        Resp: 17           Physical Exam        General:  Patient lying in bed without any acute distress    HEENT:  Normocephalic/atraumatic   Cardio:  Regular rate and rhythm   Pulmonary:  No respiratory distress   Extremities:  No edema   Skin:  Warm/dry     Neurologic Examination:  Mental Status: Alert and oriented.   Language: Speaks in full sentences. Follows commands.   Speech: No dysarthria.   Cranial Nerves: Visual fields are full to confrontation. Pupils are symmetric and briskly reactive to light. Gaze is conjugate. Extraocular movements are intact. Face is symmetric with normal eye closure and smile.   Motor: Strength normal and symmetrical in upper and lower extremities.  Sensory: Normal and symmetric to soft touch in the upper and lower extremities.          Intake and Output          Intake/Output Summary (Last 24 hours) at 12/7/2019 1123  Last data filed at 12/7/2019 1100  Gross per 24 hour   Intake 2716.38 ml   Output 2300 ml   Net 416.38 ml            Labs        CBC  Recent Labs   Lab 12/07/19  0415 12/06/19  0544 12/05/19  2243   WBC 10.4 13.4* 12.4*   HGB 11.4* 13.0* 12.8*    229 223       COAGS  Recent Labs   Lab 12/06/19  0544 12/05/19  2243   INR 1.15* 0.95   PTT 30 28   FIBR 266  --        BMP  Recent Labs   Lab 12/07/19  0415 12/06/19  0544 12/05/19  2243    136 135   POTASSIUM 3.7 4.0 3.7   CHLORIDE 102 103 101   CO2 27 24 27   BUN 16 10 14   CR 0.74 0.60* 0.68   VEL 8.2* 8.7 8.8       Liver panel:  Recent Labs   Lab Test 02/05/18  1457 06/19/14  0947 03/30/13  1107 02/26/13  1003   PROTTOTAL 8.7 8.0  --  8.6   ALBUMIN 3.9 4.3  --  4.3   BILITOTAL 1.0 0.4  --  0.2   ALKPHOS 123 101  --  102   AST 53* 37 49* 53*   * 53 71* 101*       ABGNo results for input(s): PH, PCO2, PO2, HCO3 in the last 168 hours.    CSFNo results for input(s): CGLU, CTP in the last 168 hours.    Invalid input(s): CCSF    MICRONo results for input(s): CULT in the last 168 hours.    LIPID  Profile:   Cholesterol   Date Value Ref Range Status   03/30/2013 122 0 - 200 mg/dL Final     Comment:     LDL Cholesterol is the primary guide to therapy.   The NCEP recommends further evaluation of: patients with cholesterol greater   than 200 mg/dL if additional risk factors are present, cholesterol greater   than   240 mg/dL, triglycerides greater than 150 mg/dL, or HDL less than 40 mg/dL.     HDL Cholesterol   Date Value Ref Range Status   03/30/2013 23 (L) 40 - 110 mg/dL Final     LDL Cholesterol Calculated   Date Value Ref Range Status   03/30/2013 84 0 - 129 mg/dL Final     Comment:     LDL Cholesterol is the primary guide to therapy: LDL-cholesterol goal in high   risk patients is <100 mg/dL and in very high risk patients is <70 mg/dL.     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   ICD-10:    Comparison: 12/6/2019 dated head CT     Technique:   Orbits: Axial and coronal T1-weighted, and coronal T2-weighted images  obtained without intravenous contrast. Post-intravenous contrast  (using gadolinium) sagittal FLAIR, and axial and coronal T1-weighted  images were obtained with fat-saturation, focused on the orbits.  Brain: Axial susceptibility-weighted and FLAIR sequences were obtained  of the whole brain without intravenous contrast, and postcontrast  axial T1-weighted images were obtained through the whole brain.    Contrast: 10mL Gadavist    Findings: There is a 1.8 x 1.5 cm complex lesion in the right superior  orbital rim. The lesion has areas of T1 and T2 hypointensity which  reflects osseous component and a T1 isointense, T2 hyperintense  enhancing component. This causes mild expansion of the bone and  extends into the intraorbital cavity extraconal space and slightly  impinges the superior rectus/levator palpebrae muscle complex. This is  likely a benign fibro-osseous lesion. Otherwise orbital cavity  structures are normal. Optic nerve sheath complexes are normal.  Retrobulbar fat is symmetric and normal. Lacrimal glands are within  normal limits. Globes are normal. Lenses are located. Extraocular  muscles are otherwise normal.    There is inflammatory mucosal thickening of sphenoid sinus, maxillary  sinuses and ethmoid air cells.    Elevation of the whole brain demonstrates no evidence of acute  infarction, hydrocephalus or extra-axial collection. There is  susceptibility effect within the occipital horns of lateral ventricles  due to known redistributed subarachnoid blood products. Previously  seen subarachnoid hemorrhage within the basal cisterns are not as well  visualized likely due to differences in technique.      Impression    Impression:     Minimal subarachnoid hemorrhage, particularly visualized in occipital  horns. No new hemorrhage.   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     ALISON AUSTIN MD   MR Cervical Spine w/o & w Contrast    Narrative    MR CERVICAL SPINE W/O & W CONTRAST 12/6/2019 8:32 PM    Provided History: Perimesencephalic Subarachnoid Hemorrhage;  Evaluation for Cervical Spinal Vascular Malformation/AVM  ICD-10:    Comparison: none    Technique: Sagittal T1-weighted, sagittal T2-weighted, STIR weighted,  sagittal diffusion weighted, axial T2-weighted, images of the cervical  spine were obtained without intravenous contrast.  Following  intravenous administration of gadolinium, axial T1-weighted images  with fat saturation were also obtained. Sagittal TWIST images were  obtained.    Contrast: 10mL Gadavist    Findings: Motion degraded study with low sensitivity for detection of  pathology.    The cervical vertebrae are normally aligned. Normal lordosis. There is  mild disc height narrowing at C5-6. There is normal signal within and  normal contour of the cervical spinal cord. No evidence of a cervical  spinal vascular malformation. No definite cord signal abnormality. The  findings on a level by level basis are as follows:    C2-3:  No spinal canal or neural foraminal stenosis.    C3-4:  Posterior disc bulge. No neural foraminal stenosis. Mild spinal  canal stenosis.    C4-5:  Posterior disc bulge with mild spinal canal stenosis. No  significant neural foraminal stenosis.    C5-6:  Posterior disc bulge with mild spinal canal stenosis. Mild  bilateral neural foraminal stenosis.    C6-7:  No spinal canal or neural foraminal  stenosis.    C7-T1:  No spinal canal or neural foraminal stenosis.     No abnormality of the paraspinous soft tissues. TWIST images are  technically of low quality however there are no findings on other  sequences to suggest presence of a vascular malformation.       Impression    Impression:   1. No cervical spinal vascular malformation on the available images.   2. No abnormal enhancement in the spinal cord, thecal sac or cervical  vertebrae.  3. Mild multilevel cervical spondylosis with mild spinal canal  stenosis at C3-4, C4-5, and C5-6.    I have personally reviewed the examination and initial interpretation  and I agree with the findings.    ALISON AUSTIN MD   US Transcranial Doppler Complete    Narrative    Ultrasound transcranial Doppler, 12/7/2019 9:05 AM.    Comparison: Ultrasound 12/6/2019.    History: eval vasospasm.    Findings:   The following mean arterial velocities are measured in cm/sec:    Maximum  right MCA: 58; previously 57.  Right HOWARD: 45; previously 40.  Right ICA: 39; previously 31.  Right PCA: 31; previously 35.  Right extracranial ICA: 56 ; previously 43.    Maximum left MCA: 63; previously 63.  Left HOWARD: 50; previously 40.  Left ICA: 47; previously 52.  Left PCA: 36; previously 21.  Left extracranial ICA: 45; previously 45.    There has been no significant change since comparison examination.      Impression    Impression:   No evidence of vasospasm by velocity criteria.    --------------------------------------------    Reference Values:       Middle Cerebral Artery:     Mean Flow velocity (MFV, cm/sec)  Mild: 120-150  Moderate: 150-200  Severe: >200    PSV (cm/sec)  Mild: 200-250  Moderate : 250-300  Severe: >300    Posterior cerebral artery:  PSV>120 cm/sec  MFV>85 cm/sec    Anterior cerebral artery:  PSV>120 cm/sec  MFV>80 cm/sec      Radiographics. 2013 Jan-Feb;33(1):E1-E14    I have personally reviewed the examination and initial interpretation  and I agree with the findings.    MELVIN ARCHULETA MD            Current Medications           atorvastatin  40 mg Oral QPM     insulin aspart  1-10 Units Subcutaneous TID AC     insulin aspart  1-7 Units Subcutaneous At Bedtime     insulin glargine  5 Units Subcutaneous QPM     niMODipine  60 mg Oral Q4H     polyethylene glycol  17 g Oral or Feeding Tube Daily     senna-docusate  1-2 tablet Oral BID       PRN Medications:  acetaminophen **OR** acetaminophen **OR** acetaminophen, albuterol, glucose **OR** dextrose **OR** glucagon, glucose **OR** dextrose **OR** glucagon, fentaNYL, hydrALAZINE, labetalol, naloxone, ondansetron **OR** ondansetron, oxyCODONE    Infusions:      No Known Allergies

## 2019-12-07 NOTE — PLAN OF CARE
Events: Pt weaned from nicardipine drip. Gave labetalol x1. VSS.    Neuro: Unchanged, see flowsheets. C/o mild headache, given prn pain meds.  CV: BP goal <140. Went from sinus tach to SR.  Resp: RA.   GI/: Voiding adequately. Reg diet. BGS high.   Skin/lines/drains: Intact, 2 PIV's.   Gtts: NS @ 75.    Plan: q1h neuros, TCD's, niodipine q4h. Continue plan of care.    Continue to monitor.

## 2019-12-07 NOTE — PROGRESS NOTES
Neurosurgery Progress Note    Subjective:  Evaluated by SLP -- OK for Regular Diet; angio negative; MRI brain and cervical spine complete, sliding scale insulin switched medium -> high; PT - home    Objective:  CV: Rate as above  PULM: breathing comfortably on room air  NEUROLOGIC:  -- Awake; Alert; oriented x 3  -- Follows commands briskly  -- Speech fluent, spontaneous. No aphasia or dysarthria.  -- no gaze preference. No apparent hemineglect.  Cranial Nerves:  -- visual fields full to confrontation, PERRL 3-2mm bilat and brisk, extraocular movements intact  -- face symmetrical, tongue midline  -- sensory V1-V3 intact bilaterally  -- palate elevates symmetrically, uvula midline  -- hearing grossly intact bilat  -- Trapezii 5/5 strength bilat symmetric  -- Cerebellar: Finger nose finger without dysmetria    Motor:  Normal bulk / tone; no tremor, rigidity, or bradykinesia.  No muscle wasting or fasciculations  No Pronator Drift  5/5 strength in all 4 extremities  Sensory:  intact to LT x 4 extremities     Assessment:  Mr. Harmon is a 45 yo M with PMH asthma, HTN, DM2 transferred from [facility] to [facility] after post-coital severe headache, photosensitivity, nausea/vomiting, found to have small prepontine, premedullary cistern subarachnoid hemorrhage, extending into the ventral upper spinal canal. Chen Carson 1, modified Amaya 1. Neurologically intact. CTA head/neck with no aneurysm or vascular malformation identified. Angiogram negative for aneurysm.    Plan:  - SBP < 140  - TCD, nimodipine  - regular diet  - sliding scale insulin high dosing; resumed pta glipizide -> consider insulin gtt if glucoses remain high  - PT: home    Clemente Vidales MD  Neurosurgery Resident PGY2    Please contact neurosurgery resident on call with questions.    Dial * * [phone], enter [phone] when prompted.

## 2019-12-07 NOTE — PROGRESS NOTES
Neuro: A&Ox4. PERRLA. Moves all extremities.   CV: SR/ST. -150's (nicardipine drip titrated appropriately). Goal SBP less than 140.  Pulm: Breathing comfortably on room air. Lung sounds clear  GI/: Voiding per urinal. Last BM 12/5/19. Advanced to regular diet post procedure. Good appetite eating 75% of meal tray  Skin: Right groin site WDL. Otherwise skin intact  Gtts: Nicardipine gtt @ 7.5mg/hr. NS @ 75 ml/hr  Psych/social: Mult family members at bedside & attentive to patient.    *Patient left floor for MRI approx 1800. Will report to oncoming RN.    Will continue to monitor for safety and comfort.    Liliana Shipley RN

## 2019-12-08 ENCOUNTER — APPOINTMENT (OUTPATIENT)
Dept: PHYSICAL THERAPY | Facility: CLINIC | Age: 44
DRG: 066 | End: 2019-12-08
Payer: COMMERCIAL

## 2019-12-08 ENCOUNTER — APPOINTMENT (OUTPATIENT)
Dept: ULTRASOUND IMAGING | Facility: CLINIC | Age: 44
DRG: 066 | End: 2019-12-08
Payer: COMMERCIAL

## 2019-12-08 ENCOUNTER — APPOINTMENT (OUTPATIENT)
Dept: SPEECH THERAPY | Facility: CLINIC | Age: 44
DRG: 066 | End: 2019-12-08
Payer: COMMERCIAL

## 2019-12-08 LAB
ANION GAP SERPL CALCULATED.3IONS-SCNC: 5 MMOL/L (ref 3–14)
BASOPHILS # BLD AUTO: 0 10E9/L (ref 0–0.2)
BASOPHILS NFR BLD AUTO: 0.4 %
BUN SERPL-MCNC: 13 MG/DL (ref 7–30)
CALCIUM SERPL-MCNC: 8.6 MG/DL (ref 8.5–10.1)
CHLORIDE SERPL-SCNC: 104 MMOL/L (ref 94–109)
CO2 SERPL-SCNC: 25 MMOL/L (ref 20–32)
CREAT SERPL-MCNC: 0.67 MG/DL (ref 0.66–1.25)
DIFFERENTIAL METHOD BLD: ABNORMAL
EOSINOPHIL # BLD AUTO: 1.1 10E9/L (ref 0–0.7)
EOSINOPHIL NFR BLD AUTO: 11.7 %
ERYTHROCYTE [DISTWIDTH] IN BLOOD BY AUTOMATED COUNT: 14.7 % (ref 10–15)
GFR SERPL CREATININE-BSD FRML MDRD: >90 ML/MIN/{1.73_M2}
GLUCOSE BLDC GLUCOMTR-MCNC: 183 MG/DL (ref 70–99)
GLUCOSE BLDC GLUCOMTR-MCNC: 187 MG/DL (ref 70–99)
GLUCOSE BLDC GLUCOMTR-MCNC: 193 MG/DL (ref 70–99)
GLUCOSE BLDC GLUCOMTR-MCNC: 243 MG/DL (ref 70–99)
GLUCOSE BLDC GLUCOMTR-MCNC: 264 MG/DL (ref 70–99)
GLUCOSE BLDC GLUCOMTR-MCNC: 302 MG/DL (ref 70–99)
GLUCOSE BLDC GLUCOMTR-MCNC: 366 MG/DL (ref 70–99)
GLUCOSE SERPL-MCNC: 208 MG/DL (ref 70–99)
HCT VFR BLD AUTO: 40 % (ref 40–53)
HGB BLD-MCNC: 12.4 G/DL (ref 13.3–17.7)
IMM GRANULOCYTES # BLD: 0 10E9/L (ref 0–0.4)
IMM GRANULOCYTES NFR BLD: 0.2 %
LYMPHOCYTES # BLD AUTO: 3 10E9/L (ref 0.8–5.3)
LYMPHOCYTES NFR BLD AUTO: 31.9 %
MAGNESIUM SERPL-MCNC: 2.1 MG/DL (ref 1.6–2.3)
MCH RBC QN AUTO: 23.4 PG (ref 26.5–33)
MCHC RBC AUTO-ENTMCNC: 31 G/DL (ref 31.5–36.5)
MCV RBC AUTO: 76 FL (ref 78–100)
MONOCYTES # BLD AUTO: 0.8 10E9/L (ref 0–1.3)
MONOCYTES NFR BLD AUTO: 7.9 %
NEUTROPHILS # BLD AUTO: 4.5 10E9/L (ref 1.6–8.3)
NEUTROPHILS NFR BLD AUTO: 47.9 %
NRBC # BLD AUTO: 0 10*3/UL
NRBC BLD AUTO-RTO: 0 /100
PHOSPHATE SERPL-MCNC: 2.7 MG/DL (ref 2.5–4.5)
PLATELET # BLD AUTO: 217 10E9/L (ref 150–450)
POTASSIUM SERPL-SCNC: 4 MMOL/L (ref 3.4–5.3)
RBC # BLD AUTO: 5.29 10E12/L (ref 4.4–5.9)
SODIUM SERPL-SCNC: 134 MMOL/L (ref 133–144)
WBC # BLD AUTO: 9.5 10E9/L (ref 4–11)

## 2019-12-08 PROCEDURE — 00000146 ZZHCL STATISTIC GLUCOSE BY METER IP

## 2019-12-08 PROCEDURE — 97116 GAIT TRAINING THERAPY: CPT | Mod: GP

## 2019-12-08 PROCEDURE — 84100 ASSAY OF PHOSPHORUS: CPT | Performed by: STUDENT IN AN ORGANIZED HEALTH CARE EDUCATION/TRAINING PROGRAM

## 2019-12-08 PROCEDURE — 25000132 ZZH RX MED GY IP 250 OP 250 PS 637: Performed by: STUDENT IN AN ORGANIZED HEALTH CARE EDUCATION/TRAINING PROGRAM

## 2019-12-08 PROCEDURE — 85025 COMPLETE CBC W/AUTO DIFF WBC: CPT | Performed by: STUDENT IN AN ORGANIZED HEALTH CARE EDUCATION/TRAINING PROGRAM

## 2019-12-08 PROCEDURE — 36415 COLL VENOUS BLD VENIPUNCTURE: CPT | Performed by: STUDENT IN AN ORGANIZED HEALTH CARE EDUCATION/TRAINING PROGRAM

## 2019-12-08 PROCEDURE — 97530 THERAPEUTIC ACTIVITIES: CPT | Mod: GP

## 2019-12-08 PROCEDURE — 20000004 ZZH R&B ICU UMMC

## 2019-12-08 PROCEDURE — 93886 INTRACRANIAL COMPLETE STUDY: CPT

## 2019-12-08 PROCEDURE — 80048 BASIC METABOLIC PNL TOTAL CA: CPT | Performed by: STUDENT IN AN ORGANIZED HEALTH CARE EDUCATION/TRAINING PROGRAM

## 2019-12-08 PROCEDURE — 97112 NEUROMUSCULAR REEDUCATION: CPT | Mod: GP

## 2019-12-08 PROCEDURE — 25000132 ZZH RX MED GY IP 250 OP 250 PS 637: Performed by: NEUROLOGICAL SURGERY

## 2019-12-08 PROCEDURE — 92526 ORAL FUNCTION THERAPY: CPT | Mod: GN

## 2019-12-08 PROCEDURE — 83735 ASSAY OF MAGNESIUM: CPT | Performed by: STUDENT IN AN ORGANIZED HEALTH CARE EDUCATION/TRAINING PROGRAM

## 2019-12-08 PROCEDURE — 25000131 ZZH RX MED GY IP 250 OP 636 PS 637: Performed by: STUDENT IN AN ORGANIZED HEALTH CARE EDUCATION/TRAINING PROGRAM

## 2019-12-08 RX ORDER — POLYETHYLENE GLYCOL 3350 17 G/17G
17 POWDER, FOR SOLUTION ORAL 2 TIMES DAILY
Status: DISCONTINUED | OUTPATIENT
Start: 2019-12-08 | End: 2019-12-13 | Stop reason: HOSPADM

## 2019-12-08 RX ADMIN — LISINOPRIL 10 MG: 10 TABLET ORAL at 07:58

## 2019-12-08 RX ADMIN — METFORMIN HYDROCHLORIDE 1000 MG: 500 TABLET ORAL at 08:03

## 2019-12-08 RX ADMIN — OXYCODONE HYDROCHLORIDE 5 MG: 5 TABLET ORAL at 12:04

## 2019-12-08 RX ADMIN — OXYCODONE HYDROCHLORIDE 5 MG: 5 TABLET ORAL at 03:48

## 2019-12-08 RX ADMIN — SENNOSIDES AND DOCUSATE SODIUM 2 TABLET: 8.6; 5 TABLET ORAL at 07:58

## 2019-12-08 RX ADMIN — NIMODIPINE 60 MG: 30 CAPSULE, LIQUID FILLED ORAL at 03:48

## 2019-12-08 RX ADMIN — OXYCODONE HYDROCHLORIDE 5 MG: 5 TABLET ORAL at 00:16

## 2019-12-08 RX ADMIN — POLYETHYLENE GLYCOL 3350 17 G: 17 POWDER, FOR SOLUTION ORAL at 07:57

## 2019-12-08 RX ADMIN — ACETAMINOPHEN 650 MG: 325 TABLET, FILM COATED ORAL at 23:34

## 2019-12-08 RX ADMIN — OXYCODONE HYDROCHLORIDE 5 MG: 5 TABLET ORAL at 15:57

## 2019-12-08 RX ADMIN — OXYCODONE HYDROCHLORIDE 5 MG: 5 TABLET ORAL at 21:37

## 2019-12-08 RX ADMIN — NIMODIPINE 60 MG: 30 CAPSULE, LIQUID FILLED ORAL at 23:34

## 2019-12-08 RX ADMIN — NIMODIPINE 60 MG: 30 CAPSULE, LIQUID FILLED ORAL at 19:59

## 2019-12-08 RX ADMIN — NIMODIPINE 60 MG: 30 CAPSULE, LIQUID FILLED ORAL at 12:04

## 2019-12-08 RX ADMIN — METFORMIN HYDROCHLORIDE 1000 MG: 500 TABLET ORAL at 17:10

## 2019-12-08 RX ADMIN — NIMODIPINE 60 MG: 30 CAPSULE, LIQUID FILLED ORAL at 15:57

## 2019-12-08 RX ADMIN — INSULIN ASPART 5 UNITS: 100 INJECTION, SOLUTION INTRAVENOUS; SUBCUTANEOUS at 21:37

## 2019-12-08 RX ADMIN — OXYCODONE HYDROCHLORIDE 5 MG: 5 TABLET ORAL at 07:58

## 2019-12-08 RX ADMIN — NIMODIPINE 60 MG: 30 CAPSULE, LIQUID FILLED ORAL at 07:58

## 2019-12-08 RX ADMIN — NIMODIPINE 60 MG: 30 CAPSULE, LIQUID FILLED ORAL at 00:16

## 2019-12-08 RX ADMIN — INSULIN GLARGINE 11 UNITS: 100 INJECTION, SOLUTION SUBCUTANEOUS at 02:33

## 2019-12-08 RX ADMIN — ATORVASTATIN CALCIUM 20 MG: 20 TABLET, FILM COATED ORAL at 07:58

## 2019-12-08 ASSESSMENT — VISUAL ACUITY
OU: NORMAL ACUITY

## 2019-12-08 ASSESSMENT — ACTIVITIES OF DAILY LIVING (ADL)
RETIRED_COMMUNICATION: 0-->UNDERSTANDS/COMMUNICATES WITHOUT DIFFICULTY
ADLS_ACUITY_SCORE: 12
DRESS: 0-->INDEPENDENT
AMBULATION: 0-->INDEPENDENT
ADLS_ACUITY_SCORE: 12
COGNITION: 0 - NO COGNITION ISSUES REPORTED
ADLS_ACUITY_SCORE: 12
ADLS_ACUITY_SCORE: 12
TRANSFERRING: 0-->INDEPENDENT
FALL_HISTORY_WITHIN_LAST_SIX_MONTHS: NO
TOILETING: 0-->INDEPENDENT
RETIRED_EATING: 0-->INDEPENDENT
SWALLOWING: 0-->SWALLOWS FOODS/LIQUIDS WITHOUT DIFFICULTY
ADLS_ACUITY_SCORE: 12
ADLS_ACUITY_SCORE: 12
BATHING: 0-->INDEPENDENT

## 2019-12-08 ASSESSMENT — PAIN DESCRIPTION - DESCRIPTORS
DESCRIPTORS: HEADACHE

## 2019-12-08 ASSESSMENT — MIFFLIN-ST. JEOR: SCORE: 1786.23

## 2019-12-08 NOTE — PROGRESS NOTES
Neurosurgery Progress Note    Subjective:  No issues overnight.    Objective:  CV: Rate as above  PULM: breathing comfortably on room air  NEUROLOGIC:  -- Awake; Alert; oriented x 3  -- Follows commands briskly  -- Speech fluent, spontaneous. No aphasia or dysarthria.  -- no gaze preference. No apparent hemineglect.  Cranial Nerves:  -- visual fields full to confrontation, PERRL 3-2mm bilat and brisk, extraocular movements intact  -- face symmetrical, tongue midline  -- sensory V1-V3 intact bilaterally  -- palate elevates symmetrically, uvula midline  -- hearing grossly intact bilat  -- Trapezii 5/5 strength bilat symmetric  -- Cerebellar: Finger nose finger without dysmetria    Motor:  Normal bulk / tone; no tremor, rigidity, or bradykinesia.  No muscle wasting or fasciculations  No Pronator Drift  5/5 strength in all 4 extremities  Sensory:  intact to LT x 4 extremities     Assessment:  Mr. Harmon is a 45 yo M with PMH asthma, HTN, DM2 transferred from [facility] to [facility] after post-coital severe headache, photosensitivity, nausea/vomiting, found to have small prepontine, premedullary cistern subarachnoid hemorrhage, extending into the ventral upper spinal canal. Chen Carson 1, modified Amaya 1. Neurologically intact. CTA head/neck with no aneurysm or vascular malformation identified. Angiogram negative for aneurysm.    Plan:  - SBP < 140  - TCD, nimodipine  - regular diet  - sliding scale insulin high dosing; resumed pta glipizide -> consider insulin gtt if glucoses remain high  - PT: home  - May transfer to floor later today    -----------------------------------  Jessica Oliva MD, MS  Neurosurgery PGY-3      Please contact neurosurgery resident on call with questions.    Dial * * [phone], enter [phone] when prompted.

## 2019-12-08 NOTE — PLAN OF CARE
4A Discharge Planner PT   Patient plan for discharge: home with assist from SO (present during session)  Current status: VSS on RA. STSs, IND - increased TWYLA and extra steps needed for stability. Progressed gait endurance and pattern without assistive device for > 500 ft - decreased gait speed (elected by pt), intermittent lateral stepping to prevent LOBs (adjusted safely), and increased guarding with mobility. Promoted increased stability with balance challenges while ambulating in hallways, SBA-IND. Improved stability/balance noted as therapy session continued. Encouraged 3-4 walks/day and to get up in chair 3x/day with SBA from family or nursing to promote increased functional endurance and balance.     Barriers to return to prior living situation: medical status  Recommendations for discharge: Home with assist from SO as needed + OP PT  Rationale for recommendations: Pt is safe to return home with assistance from SO for heavier ADLs. Pt would benefit from continued skilled therapy to address higher level balance and functional endurance.         Entered by: Elsy Hernandez 12/08/2019 8:53 AM

## 2019-12-08 NOTE — PLAN OF CARE
Discharge Planner SLP   Patient plan for discharge: Unknown  Current status: Recommend continuation of regular diet/thin liquids. Pt to be fully alert and upright for all PO, taking small bites/sips at slow rate. No additional SLP services indicated.   Barriers to return to prior living situation: None from ST perspective  Recommendations for discharge: Defer to PT/OT  Rationale for recommendations: Functional oropharyngeal swallow mechanism       Entered by: Ro Cesar 12/08/2019 2:47 PM     Speech Language Therapy Discharge Summary    Reason for therapy discharge:    All goals and outcomes met, no further needs identified.    Progress towards therapy goal(s). See goals on Care Plan in [facility] electronic health record for goal details.  Goals met    Therapy recommendation(s):    No further therapy is recommended.

## 2019-12-08 NOTE — PLAN OF CARE
Status: Patient care [id number].     Neuro: Alert and oriented x4, PERRL, pupils 3 mm, able to use call light and make needs known, stand by assist for transfers, PRN Oxycodone 5 mg q4h for HA and back pain   CV: Afebrile, sinus rhythm, HR 70-90s, SBP goal < 140, no interventions needed to meet BP goal   Resp: RA, SpO2 96-99%, clear lung sounds   : Voiding in urinal at bedside, adequate UO (see flowsheet)   GI: Regular diet, tolerated 3 small meals well, no BM, schedule Senna x2 and Miralax given   Lines/Drains: PIV x2   Social: Wife at bedside as updated as appropriate     See flow sheets for further interventions and assessments. Plan for q2h neuro assessments during the day and q4h overnight. Notify Neuro Surgery Team or Neuro Critical Care Team of changes/concerns.

## 2019-12-08 NOTE — CONSULTS
Stroke Education Note    The following information has been reviewed with the patient and family:    1. Warning signs of stroke    2. Calling 911 if having warning signs of stroke    3. All modifiable risk factors: hypertension, CAD, atrial fib, diabetes, hypercholesterolemia, smoking, substance abuse, diet, physical inactivity, obesity, sleep apnea.    4. Patient's risk factors for stroke which include: HTN, DM2,high cholesterol, unhealthy diet    5. Follow-up plan for after discharge    6. Discharge medications which include: Lipitor, Glipizide, Hydralazine, labetelol    In addition, the [facility] Stroke Class Handout has been given to the patient and family.    Learner's response to risk factors / lifestyle modification education: Desire to change Ability to change     Met with pt and wife at bedside for [facility] stroke education. Reviewed powerpoint and educated on all stroke warning signs and risk factors for stroke. Pt and wife verbalize feeling comfortable identifying stroke symptoms and when to call 911. Pt and wife appreciated the class.     Jennifer Goldman RN

## 2019-12-08 NOTE — PLAN OF CARE
Neuro: Intact. AOx4. Follows all commands. Pupils PERRLA, 3mm.   CV: SR. Normotensive. SBP goal <140s. SBP ~130s. Afebrile.   Resp: room air. Lungs are clear.   GI/: SBA to commode/urinal. BM overnight. Active bowel sounds. Regular diet. BG q4 hrs.   Skin: intact besides some bruises.   Plan: transfer to floor today.

## 2019-12-08 NOTE — PROGRESS NOTES
"Neuroscience Intensive Care Progress Note           Assessment and Plan         Current hospital day: Hospital Day: 3    Vanesa Harmon is a 44 year old-year-old gentleman with a history of diabetes who was admitted on 12/6/2019 with an angio-negative prepontine subarachnoid hemorrhage.      Neurologic:  #Angio-negative prepontine subarachnoid hemorrhage, HH1 mFS1  #Headache  - Daily TCDs  - Nimodipine 60 mg Q4H  - Neuro checks Q2H during day and Q4H at night   - Repeat angio in the future  - Pain control with Tylenol/Oxycodone  - SBP goal <140  - MRI of the cervical spine: negative for vascular malformation      Cardiovascular:   #Hypertension  - SBP goal <140  - Hydralazine as needed for BP goal   - Continue home lisinopril 10 mg daily      #Hyperlipidemia  - Continue home atorvastatin 40 mg daily      Echo 12/6: EF of 60-65%. Relative wall thickness is increased consistent with concentric remodeling.      Pulmonary:  No active issues.   - PRN inhalers      Renal:  No active issues  - Daily BMP     Endocrine:  #Type II diabetes mellitus, uncontrolled   - Continue long acting insulin at home dose  - High intensity sliding scale insulin   - Continue home metformin     Hematologic:  No active issues.   - Daily CBC      Gastrointestinal:  No active issues.   - Bowel regimen in place      Nutrition: General diet      Infectious diseases:   No active issues.      PPX:    DVT: SCDs     GI: Not indicated   Lines/Tubes: PIVs  Code Status: Full Code     Dispo: ICU     Evi Krause   Neuro ICU Fellow   Pager: [phone]            Subjective        Vanesa Harmon is a 44 year old-year-old gentleman with a history of diabetes who was admitted on 12/6/2019 with an angio-negative subarachnoid hemorrhage.     24 hour events:  Continues to have headaches. No other major problems.            24 Hour Vital Signs Summary        /89 (BP Location: Left arm)   Pulse 103   Temp 98.2  F (36.8  C) (Oral)   Resp 11   Ht 1.676 m (5' 6\") "   Wt 94.3 kg (208 lb)   SpO2 100%   BMI 33.57 kg/m           Ventilator Settings        Resp: 11           Physical Exam        General:  Patient sitting in chair without any acute distress    HEENT:  Normocephalic/atraumatic   Cardio:  Regular rate and rhythm   Pulmonary:  No respiratory distress   Extremities:  No edema   Skin:  Warm/dry      Neurologic Examination:  Mental Status: Alert and oriented.   Language: Speaks in full sentences. Follows commands.   Speech: No dysarthria.   Cranial Nerves: Visual fields are full to confrontation. Pupils are symmetric and briskly reactive to light. Gaze is conjugate. Extraocular movements are intact. Face is symmetric with normal eye closure and smile.   Motor: Strength normal and symmetrical in upper and lower extremities.  Sensory: Normal and symmetric to soft touch in the upper and lower extremities.          Intake and Output          Intake/Output Summary (Last 24 hours) at 12/8/2019 0903  Last data filed at 12/8/2019 0800  Gross per 24 hour   Intake 2070 ml   Output 1525 ml   Net 545 ml            Labs        CBC  Recent Labs   Lab 12/08/19  0409 12/07/19  0415 12/06/19  0544 12/05/19  2243   WBC 9.5 10.4 13.4* 12.4*   HGB 12.4* 11.4* 13.0* 12.8*    210 229 223       COAGS  Recent Labs   Lab 12/06/19  0544 12/05/19  2243   INR 1.15* 0.95   PTT 30 28   FIBR 266  --        BMP  Recent Labs   Lab 12/08/19  0409 12/07/19  0415 12/06/19  0544 12/05/19  2243    134 136 135   POTASSIUM 4.0 3.7 4.0 3.7   CHLORIDE 104 102 103 101   CO2 25 27 24 27   BUN 13 16 10 14   CR 0.67 0.74 0.60* 0.68   VEL 8.6 8.2* 8.7 8.8       Liver panel:  Recent Labs   Lab Test 02/05/18  1457 06/19/14  0947 03/30/13  1107 02/26/13  1003   PROTTOTAL 8.7 8.0  --  8.6   ALBUMIN 3.9 4.3  --  4.3   BILITOTAL 1.0 0.4  --  0.2   ALKPHOS 123 101  --  102   AST 53* 37 49* 53*   * 53 71* 101*       ABGNo results for input(s): PH, PCO2, PO2, HCO3 in the last 168 hours.    CSFNo results  for input(s): CGLU, CTP in the last 168 hours.    Invalid input(s): CCSF    MICRONo results for input(s): CULT in the last 168 hours.    LIPID Profile:   Cholesterol   Date Value Ref Range Status   03/30/2013 122 0 - 200 mg/dL Final     Comment:     LDL Cholesterol is the primary guide to therapy.   The NCEP recommends further evaluation of: patients with cholesterol greater   than 200 mg/dL if additional risk factors are present, cholesterol greater   than   240 mg/dL, triglycerides greater than 150 mg/dL, or HDL less than 40 mg/dL.     HDL Cholesterol   Date Value Ref Range Status   03/30/2013 23 (L) 40 - 110 mg/dL Final     LDL Cholesterol Calculated   Date Value Ref Range Status   03/30/2013 84 0 - 129 mg/dL Final     Comment:     LDL Cholesterol is the primary guide to therapy: LDL-cholesterol goal in high   risk patients is <100 mg/dL and in very high risk patients is <70 mg/dL.     Triglycerides   Date Value Ref Range Status   03/30/2013 75 0 - 150 mg/dL Final     Cholesterol/HDL Ratio   Date Value Ref Range Status   03/30/2013 5.4 (H) 0.0 - 5.0 Final       A1C:   Recent Labs   Lab Test 12/06/19  0213 06/19/14  0947   A1C 11.5* 6.4*       Troponin I:   Recent Labs   Lab Test 02/05/18  1457   TROPI <0.015            Imaging and Tests         Recent Results (from the past 24 hour(s))   US Transcranial Doppler Complete    Narrative    Ultrasound transcranial Doppler, 12/7/2019 9:05 AM.    Comparison: Ultrasound 12/6/2019.    History: eval vasospasm.    Findings:   The following mean arterial velocities are measured in cm/sec:    Maximum right MCA: 58; previously 57.  Right HOWARD: 45; previously 40.  Right ICA: 39; previously 31.  Right PCA: 31; previously 35.  Right extracranial ICA: 56 ; previously 43.    Maximum left MCA: 63; previously 63.  Left HOWARD: 50; previously 40.  Left ICA: 47; previously 52.  Left PCA: 36; previously 21.  Left extracranial ICA: 45; previously 45.    There has been no significant change  since comparison examination.      Impression    Impression:   No evidence of vasospasm by velocity criteria.    --------------------------------------------    Reference Values:       Middle Cerebral Artery:     Mean Flow velocity (MFV, cm/sec)  Mild: 120-150  Moderate: 150-200  Severe: >200    PSV (cm/sec)  Mild: 200-250  Moderate : 250-300  Severe: >300    Posterior cerebral artery:  PSV>120 cm/sec  MFV>85 cm/sec    Anterior cerebral artery:  PSV>120 cm/sec  MFV>80 cm/sec      Radiographics. 2013 Jan-Feb;33(1):E1-E14    I have personally reviewed the examination and initial interpretation  and I agree with the findings.    MELVIN ARCHULETA MD            Current Medications           atorvastatin  20 mg Oral Daily     insulin aspart  1-10 Units Subcutaneous TID AC     insulin aspart  1-7 Units Subcutaneous At Bedtime     insulin glargine  11 Units Subcutaneous QPM     lisinopril  10 mg Oral Daily     metFORMIN  1,000 mg Oral BID w/meals     niMODipine  60 mg Oral Q4H     polyethylene glycol  17 g Oral or Feeding Tube Daily     senna-docusate  1-2 tablet Oral BID       PRN Medications:  acetaminophen **OR** acetaminophen **OR** acetaminophen, albuterol, glucose **OR** dextrose **OR** glucagon, fentaNYL, hydrALAZINE, labetalol, naloxone, ondansetron **OR** ondansetron, oxyCODONE    Infusions:      No Known Allergies

## 2019-12-09 ENCOUNTER — APPOINTMENT (OUTPATIENT)
Dept: PHYSICAL THERAPY | Facility: CLINIC | Age: 44
DRG: 066 | End: 2019-12-09
Payer: COMMERCIAL

## 2019-12-09 ENCOUNTER — APPOINTMENT (OUTPATIENT)
Dept: ULTRASOUND IMAGING | Facility: CLINIC | Age: 44
DRG: 066 | End: 2019-12-09
Payer: COMMERCIAL

## 2019-12-09 ENCOUNTER — APPOINTMENT (OUTPATIENT)
Dept: CT IMAGING | Facility: CLINIC | Age: 44
DRG: 066 | End: 2019-12-09
Attending: NURSE PRACTITIONER
Payer: COMMERCIAL

## 2019-12-09 LAB
ABO + RH BLD: NORMAL
ABO + RH BLD: NORMAL
ANION GAP SERPL CALCULATED.3IONS-SCNC: 5 MMOL/L (ref 3–14)
BASOPHILS # BLD AUTO: 0 10E9/L (ref 0–0.2)
BASOPHILS NFR BLD AUTO: 0.5 %
BLD GP AB SCN SERPL QL: NORMAL
BLOOD BANK CMNT PATIENT-IMP: NORMAL
BUN SERPL-MCNC: 13 MG/DL (ref 7–30)
CALCIUM SERPL-MCNC: 8.7 MG/DL (ref 8.5–10.1)
CHLORIDE SERPL-SCNC: 104 MMOL/L (ref 94–109)
CO2 SERPL-SCNC: 26 MMOL/L (ref 20–32)
CREAT SERPL-MCNC: 0.71 MG/DL (ref 0.66–1.25)
DIFFERENTIAL METHOD BLD: ABNORMAL
EOSINOPHIL # BLD AUTO: 1.1 10E9/L (ref 0–0.7)
EOSINOPHIL NFR BLD AUTO: 13.4 %
ERYTHROCYTE [DISTWIDTH] IN BLOOD BY AUTOMATED COUNT: 14.6 % (ref 10–15)
GFR SERPL CREATININE-BSD FRML MDRD: >90 ML/MIN/{1.73_M2}
GLUCOSE BLDC GLUCOMTR-MCNC: 157 MG/DL (ref 70–99)
GLUCOSE BLDC GLUCOMTR-MCNC: 163 MG/DL (ref 70–99)
GLUCOSE BLDC GLUCOMTR-MCNC: 176 MG/DL (ref 70–99)
GLUCOSE BLDC GLUCOMTR-MCNC: 263 MG/DL (ref 70–99)
GLUCOSE BLDC GLUCOMTR-MCNC: 287 MG/DL (ref 70–99)
GLUCOSE SERPL-MCNC: 161 MG/DL (ref 70–99)
HCT VFR BLD AUTO: 39.2 % (ref 40–53)
HGB BLD-MCNC: 12.1 G/DL (ref 13.3–17.7)
IMM GRANULOCYTES # BLD: 0 10E9/L (ref 0–0.4)
IMM GRANULOCYTES NFR BLD: 0.4 %
LYMPHOCYTES # BLD AUTO: 2.7 10E9/L (ref 0.8–5.3)
LYMPHOCYTES NFR BLD AUTO: 32.7 %
MAGNESIUM SERPL-MCNC: 1.9 MG/DL (ref 1.6–2.3)
MCH RBC QN AUTO: 23.5 PG (ref 26.5–33)
MCHC RBC AUTO-ENTMCNC: 30.9 G/DL (ref 31.5–36.5)
MCV RBC AUTO: 76 FL (ref 78–100)
MONOCYTES # BLD AUTO: 0.8 10E9/L (ref 0–1.3)
MONOCYTES NFR BLD AUTO: 9.8 %
NEUTROPHILS # BLD AUTO: 3.6 10E9/L (ref 1.6–8.3)
NEUTROPHILS NFR BLD AUTO: 43.2 %
NRBC # BLD AUTO: 0 10*3/UL
NRBC BLD AUTO-RTO: 0 /100
PHOSPHATE SERPL-MCNC: 3.7 MG/DL (ref 2.5–4.5)
PLATELET # BLD AUTO: 194 10E9/L (ref 150–450)
POTASSIUM SERPL-SCNC: 4.1 MMOL/L (ref 3.4–5.3)
RBC # BLD AUTO: 5.14 10E12/L (ref 4.4–5.9)
SODIUM SERPL-SCNC: 136 MMOL/L (ref 133–144)
SPECIMEN EXP DATE BLD: NORMAL
TROPONIN I SERPL-MCNC: <0.015 UG/L (ref 0–0.04)
TROPONIN I SERPL-MCNC: <0.015 UG/L (ref 0–0.04)
WBC # BLD AUTO: 8.3 10E9/L (ref 4–11)

## 2019-12-09 PROCEDURE — 25000132 ZZH RX MED GY IP 250 OP 250 PS 637: Performed by: STUDENT IN AN ORGANIZED HEALTH CARE EDUCATION/TRAINING PROGRAM

## 2019-12-09 PROCEDURE — 83735 ASSAY OF MAGNESIUM: CPT | Performed by: STUDENT IN AN ORGANIZED HEALTH CARE EDUCATION/TRAINING PROGRAM

## 2019-12-09 PROCEDURE — 36415 COLL VENOUS BLD VENIPUNCTURE: CPT | Performed by: STUDENT IN AN ORGANIZED HEALTH CARE EDUCATION/TRAINING PROGRAM

## 2019-12-09 PROCEDURE — 93886 INTRACRANIAL COMPLETE STUDY: CPT

## 2019-12-09 PROCEDURE — 36415 COLL VENOUS BLD VENIPUNCTURE: CPT | Performed by: NURSE PRACTITIONER

## 2019-12-09 PROCEDURE — 93010 ELECTROCARDIOGRAM REPORT: CPT | Performed by: INTERNAL MEDICINE

## 2019-12-09 PROCEDURE — 12000001 ZZH R&B MED SURG/OB UMMC

## 2019-12-09 PROCEDURE — 25000132 ZZH RX MED GY IP 250 OP 250 PS 637: Performed by: NURSE PRACTITIONER

## 2019-12-09 PROCEDURE — 25000132 ZZH RX MED GY IP 250 OP 250 PS 637: Performed by: NEUROLOGICAL SURGERY

## 2019-12-09 PROCEDURE — 97116 GAIT TRAINING THERAPY: CPT | Mod: GP

## 2019-12-09 PROCEDURE — 80048 BASIC METABOLIC PNL TOTAL CA: CPT | Performed by: STUDENT IN AN ORGANIZED HEALTH CARE EDUCATION/TRAINING PROGRAM

## 2019-12-09 PROCEDURE — 70450 CT HEAD/BRAIN W/O DYE: CPT

## 2019-12-09 PROCEDURE — 84484 ASSAY OF TROPONIN QUANT: CPT | Performed by: STUDENT IN AN ORGANIZED HEALTH CARE EDUCATION/TRAINING PROGRAM

## 2019-12-09 PROCEDURE — 84100 ASSAY OF PHOSPHORUS: CPT | Performed by: STUDENT IN AN ORGANIZED HEALTH CARE EDUCATION/TRAINING PROGRAM

## 2019-12-09 PROCEDURE — 40000894 ZZH STATISTIC OT IP EVAL DEFER: Performed by: OCCUPATIONAL THERAPIST

## 2019-12-09 PROCEDURE — 93005 ELECTROCARDIOGRAM TRACING: CPT

## 2019-12-09 PROCEDURE — 86900 BLOOD TYPING SEROLOGIC ABO: CPT | Performed by: STUDENT IN AN ORGANIZED HEALTH CARE EDUCATION/TRAINING PROGRAM

## 2019-12-09 PROCEDURE — 86901 BLOOD TYPING SEROLOGIC RH(D): CPT | Performed by: STUDENT IN AN ORGANIZED HEALTH CARE EDUCATION/TRAINING PROGRAM

## 2019-12-09 PROCEDURE — 97112 NEUROMUSCULAR REEDUCATION: CPT | Mod: GP

## 2019-12-09 PROCEDURE — 86850 RBC ANTIBODY SCREEN: CPT | Performed by: STUDENT IN AN ORGANIZED HEALTH CARE EDUCATION/TRAINING PROGRAM

## 2019-12-09 PROCEDURE — 84484 ASSAY OF TROPONIN QUANT: CPT | Performed by: NURSE PRACTITIONER

## 2019-12-09 PROCEDURE — 85025 COMPLETE CBC W/AUTO DIFF WBC: CPT | Performed by: STUDENT IN AN ORGANIZED HEALTH CARE EDUCATION/TRAINING PROGRAM

## 2019-12-09 PROCEDURE — 00000146 ZZHCL STATISTIC GLUCOSE BY METER IP

## 2019-12-09 RX ORDER — HYDRALAZINE HYDROCHLORIDE 20 MG/ML
10-20 INJECTION INTRAMUSCULAR; INTRAVENOUS
Status: DISCONTINUED | OUTPATIENT
Start: 2019-12-09 | End: 2019-12-13 | Stop reason: HOSPADM

## 2019-12-09 RX ORDER — LABETALOL 20 MG/4 ML (5 MG/ML) INTRAVENOUS SYRINGE
10-20
Status: DISCONTINUED | OUTPATIENT
Start: 2019-12-09 | End: 2019-12-13 | Stop reason: HOSPADM

## 2019-12-09 RX ORDER — ACETAMINOPHEN 325 MG/1
650 TABLET ORAL EVERY 4 HOURS
Status: DISCONTINUED | OUTPATIENT
Start: 2019-12-09 | End: 2019-12-13 | Stop reason: HOSPADM

## 2019-12-09 RX ORDER — OXYCODONE HYDROCHLORIDE 5 MG/1
5-10 TABLET ORAL EVERY 4 HOURS PRN
Status: DISCONTINUED | OUTPATIENT
Start: 2019-12-09 | End: 2019-12-13 | Stop reason: HOSPADM

## 2019-12-09 RX ADMIN — NIMODIPINE 60 MG: 30 CAPSULE, LIQUID FILLED ORAL at 04:04

## 2019-12-09 RX ADMIN — POLYETHYLENE GLYCOL (3350) 17 G: 17 POWDER, FOR SOLUTION ORAL at 07:45

## 2019-12-09 RX ADMIN — OXYCODONE HYDROCHLORIDE 5 MG: 5 TABLET ORAL at 16:09

## 2019-12-09 RX ADMIN — SENNOSIDES AND DOCUSATE SODIUM 1 TABLET: 8.6; 5 TABLET ORAL at 07:45

## 2019-12-09 RX ADMIN — METFORMIN HYDROCHLORIDE 1000 MG: 500 TABLET ORAL at 16:09

## 2019-12-09 RX ADMIN — OXYCODONE HYDROCHLORIDE 5 MG: 5 TABLET ORAL at 11:33

## 2019-12-09 RX ADMIN — POLYETHYLENE GLYCOL (3350) 17 G: 17 POWDER, FOR SOLUTION ORAL at 20:53

## 2019-12-09 RX ADMIN — NIMODIPINE 60 MG: 30 CAPSULE, LIQUID FILLED ORAL at 20:54

## 2019-12-09 RX ADMIN — NIMODIPINE 60 MG: 30 CAPSULE, LIQUID FILLED ORAL at 07:45

## 2019-12-09 RX ADMIN — ACETAMINOPHEN 650 MG: 325 TABLET, FILM COATED ORAL at 14:27

## 2019-12-09 RX ADMIN — INSULIN ASPART 4 UNITS: 100 INJECTION, SOLUTION INTRAVENOUS; SUBCUTANEOUS at 21:12

## 2019-12-09 RX ADMIN — ATORVASTATIN CALCIUM 20 MG: 20 TABLET, FILM COATED ORAL at 07:45

## 2019-12-09 RX ADMIN — METFORMIN HYDROCHLORIDE 1000 MG: 500 TABLET ORAL at 07:45

## 2019-12-09 RX ADMIN — ACETAMINOPHEN 650 MG: 325 TABLET, FILM COATED ORAL at 23:53

## 2019-12-09 RX ADMIN — SENNOSIDES AND DOCUSATE SODIUM 1 TABLET: 8.6; 5 TABLET ORAL at 20:53

## 2019-12-09 RX ADMIN — OXYCODONE HYDROCHLORIDE 5 MG: 5 TABLET ORAL at 04:04

## 2019-12-09 RX ADMIN — NIMODIPINE 60 MG: 30 CAPSULE, LIQUID FILLED ORAL at 23:53

## 2019-12-09 RX ADMIN — OXYCODONE HYDROCHLORIDE 5 MG: 5 TABLET ORAL at 07:45

## 2019-12-09 RX ADMIN — OXYCODONE HYDROCHLORIDE 5 MG: 5 TABLET ORAL at 14:27

## 2019-12-09 RX ADMIN — OXYCODONE HYDROCHLORIDE 10 MG: 5 TABLET ORAL at 21:12

## 2019-12-09 RX ADMIN — NIMODIPINE 60 MG: 30 CAPSULE, LIQUID FILLED ORAL at 11:33

## 2019-12-09 RX ADMIN — ACETAMINOPHEN 650 MG: 325 TABLET, FILM COATED ORAL at 11:11

## 2019-12-09 RX ADMIN — NIMODIPINE 60 MG: 30 CAPSULE, LIQUID FILLED ORAL at 16:04

## 2019-12-09 RX ADMIN — ACETAMINOPHEN 650 MG: 325 TABLET, FILM COATED ORAL at 20:53

## 2019-12-09 RX ADMIN — LISINOPRIL 10 MG: 10 TABLET ORAL at 07:45

## 2019-12-09 ASSESSMENT — VISUAL ACUITY
OU: NORMAL ACUITY

## 2019-12-09 ASSESSMENT — ACTIVITIES OF DAILY LIVING (ADL)
ADLS_ACUITY_SCORE: 12

## 2019-12-09 ASSESSMENT — PAIN DESCRIPTION - DESCRIPTORS
DESCRIPTORS: HEADACHE

## 2019-12-09 ASSESSMENT — MIFFLIN-ST. JEOR: SCORE: 1754.93

## 2019-12-09 NOTE — PLAN OF CARE
I/A: No acute changes overnight. A&O. VSS. SBP goal < 140. Neuro status remains intact, q4hr checks overnight. NSR 60-90s. LS clear. Reg diet. BGMs improving. Voiding adequately.   P: Transfer to floor today. Contact NSG/NCC with questions/concerns.

## 2019-12-09 NOTE — PLAN OF CARE
Status: Patient care [id number].      Neuro: Alert and oriented x4, PERRL, pupils 3 mm, able to use call light and make needs known, stand by assist for transfers, walked unit hallways with PT, PRN Oxycodone 5 mg q4h for HA and back pain   CV: Afebrile, sinus rhythm, HR 70-90s, SBP goal < 140, no interventions needed to meet BP goal   Resp: RA, SpO2 96-99%, clear lung sounds   : Voiding in urinal at bedside, adequate UO (see flowsheet)   GI: Regular diet, tolerated 3 small meals well, BM x1  Lines/Drains: PIV x2   Social: Wife at bedside as updated as appropriate      See flow sheets for further interventions and assessments. Plan for q2h neuro assessments during the day and q4h overnight. Notify Neuro Surgery Team or Neuro Critical Care Team of changes/concerns.

## 2019-12-09 NOTE — PLAN OF CARE
OT Defer    Discharge Planner OT   Patient plan for discharge: home  Current status: OT orders received, reviewed and completed. Per  discussion w/ PT and thorough chart review, pt w/ no OT needs at this time. Pt moving ind w/ PT, no cognitive concerns at this time per PT. OT will complete orders and cancel OT eval. Thank you for the referral.   Barriers to return to prior living situation: none per OT  Recommendations for discharge: home  Rationale for recommendations: please refer to PT note for details       Entered by: Mayra Elder 12/09/2019 3:45 PM

## 2019-12-09 NOTE — PROGRESS NOTES
"CLINICAL NUTRITION SERVICES - BRIEF NOTE      Reason for RD note: Positive nutrition admission risk screen for \"new/uncontrolled diabetes\".    -->Per chart review, pt seen outpatient for diabetes follow-up on 10/4/2019:  \"Last visit was on 12/21/2018  Pt was diagnosed with diabetes on 12/2015 - A1C was13.0 and he was started on metformin and Lantus and shortly after that stopped Lantus because of low BG and improving life style ,he continued to be on Metformin 1000 mg BID  and his A1C continued to be stable and within goal mid to high 7's but was found last year to have A1c value of 10.0  Glipizide xl 2.5 mg was added to his medications and he has made several diet changes. he cut down on sweets and sodas, he has been eating more healthy food including vegetables.  A1C was 9.3 on 4/19  States that his BG readings has improved very much, did not bring his glucometer but states readings are usually within low 100's\"    --> Per pt: He had been doing better with controlling blood sugars until more recently. He denies noticing any really elevated BG recently (pt unable to quantify), but he shares has been eating larger portions of sweets, rice, and flour-containing foods. He tries to make at least half of his grain intake whole grain. He shares he has received diabetes diet education in the past from his physicians and felt the information was complete - he denied any need for nutrition education or intervention at this time from RD. He shares he knows the dietary changes he needs to make and he knows it is important for his health. Pt seemed a little surprised when RD shared his most recent Hgb A1C of 11.5%.    New Findings:  Labs: Hgb A1C 11.5%; BG 180s-360s since admission     Meds: High dose sliding scale insulin TID before meals and HS + Metformin 1000 mg BID + Lantus qOM    Diet: Regular diet    Interventions:  -Nutrition education: Provided brief diabetic diet review. Pt seemed knowledgeable about CHO sources in " diet, citing rice, desserts, and flour-based foods as significant contributors to carb intake in his diet. He has had previous success with controlling BG with dietary changes and shares he has received diet education in the past and felt this was complete. Encouraged pt to reach out to RD if any further questions regarding nutrition.    Nutrition will continue to follow per protocol.    Melody Chiu RD, LD  Pager: [phone]

## 2019-12-09 NOTE — PROGRESS NOTES
Neurosurgery Progress Note    Subjective:  restarted metformin, increased lantus    Objective:  CV: Rate as above  PULM: breathing comfortably on room air  NEUROLOGIC:  -- Awake; Alert; oriented x 3  -- Follows commands briskly  -- Speech fluent, spontaneous. No aphasia or dysarthria.  -- no gaze preference. No apparent hemineglect.  Cranial Nerves:  -- visual fields full to confrontation, PERRL 3-2mm bilat and brisk, extraocular movements intact  -- face symmetrical, tongue midline  -- sensory V1-V3 intact bilaterally  -- palate elevates symmetrically, uvula midline  -- hearing grossly intact bilat  -- Trapezii 5/5 strength bilat symmetric  -- Cerebellar: Finger nose finger without dysmetria    Motor:  Normal bulk / tone; no tremor, rigidity, or bradykinesia.  No muscle wasting or fasciculations  No Pronator Drift  5/5 strength in all 4 extremities  Sensory:  intact to LT x 4 extremities     Assessment:  Mr. Harmon is a 45 yo M with PMH asthma, HTN, DM2 transferred from [facility] to [facility] after post-coital severe headache, photosensitivity, nausea/vomiting, found to have small prepontine, premedullary cistern subarachnoid hemorrhage, extending into the ventral upper spinal canal. Chen Carson 1, modified Amaya 1. Neurologically intact. CTA head/neck with no aneurysm or vascular malformation identified. Angiogram negative for aneurysm.    Plan:  - SBP < 140  - TCD, nimodipine  - regular diet  - sliding scale insulin high dosing; resumed pta glipizide, metformin, increased lantus -> consider insulin gtt if glucoses remain high  - PT: home  - Transfer to floor 12/9    -----------------------------------  Clemente Vidales MD  Neurosurgery Resident PGY2    Please contact neurosurgery resident on call with questions.    Dial * * [phone], enter [phone] when prompted.

## 2019-12-09 NOTE — PLAN OF CARE
4A  Discharge Planner PT   Patient plan for discharge: home with family  Current status: Pt ambulated > 500' with no AD, progressing from CGA - IND with ambulation, no LOB or unsteadiness with head turns, tandem walking, or dual task. Pt with decreased gait speed and WBOS. Recommend pt ambulate 3-4x/day, appropriate to ambulate IND in hallways and room once off telemetry. /90 post-ambulation.   Barriers to return to prior living situation: medical status, impaired functional mobility  Recommendations for discharge: home with OP PT  Rationale for recommendations: Pt moving well, IND with ambulation with no LOB. Pt continues to below baseline, as he is requiring a railing with stairs and moving at a decreased gait speed with a WBOS but safe for discharge home. Has supportive family that is available to help.       Entered by: Radhika Bates 12/09/2019 3:22 PM

## 2019-12-09 NOTE — PLAN OF CARE
Neuro: A&Ox4. Only neuro deficit is photophobic headache. Headache controlled with scheduled tylenol and prn oxycodone.   Cardiac: NSR. Goal SBP< 140, no prns given today. CP reported this AM, STAT ECG and troponins ordered, all negative.   Resp: Sating high 90s on RA. Lung sounds clear. Denies any SOB.   GI: Tolerating regular diet. Denies any nausea. Passing flatus, LBM 12/8/2019.  : Voids spontaneously. Adequate UOP.   Skin: Generalized bruising. Independently repositioning in bed.   Musc: Up independently with therapy in tam. Continue with goal of 3 walks daily.   LADs: PIVx1 saline locked.   Endo: Continue with sliding scale insulin.     Plan: Frequent neuro checks. Scheduled nimodipine. Daily TCDs. Transfer to .

## 2019-12-10 ENCOUNTER — APPOINTMENT (OUTPATIENT)
Dept: PHYSICAL THERAPY | Facility: CLINIC | Age: 44
DRG: 066 | End: 2019-12-10
Payer: COMMERCIAL

## 2019-12-10 LAB
ANION GAP SERPL CALCULATED.3IONS-SCNC: 5 MMOL/L (ref 3–14)
BASOPHILS # BLD AUTO: 0.1 10E9/L (ref 0–0.2)
BASOPHILS NFR BLD AUTO: 0.6 %
BUN SERPL-MCNC: 12 MG/DL (ref 7–30)
CALCIUM SERPL-MCNC: 9.1 MG/DL (ref 8.5–10.1)
CHLORIDE SERPL-SCNC: 101 MMOL/L (ref 94–109)
CO2 SERPL-SCNC: 29 MMOL/L (ref 20–32)
CREAT SERPL-MCNC: 0.72 MG/DL (ref 0.66–1.25)
DIFFERENTIAL METHOD BLD: ABNORMAL
EOSINOPHIL # BLD AUTO: 1.3 10E9/L (ref 0–0.7)
EOSINOPHIL NFR BLD AUTO: 14.5 %
ERYTHROCYTE [DISTWIDTH] IN BLOOD BY AUTOMATED COUNT: 14.7 % (ref 10–15)
GFR SERPL CREATININE-BSD FRML MDRD: >90 ML/MIN/{1.73_M2}
GLUCOSE BLDC GLUCOMTR-MCNC: 163 MG/DL (ref 70–99)
GLUCOSE BLDC GLUCOMTR-MCNC: 170 MG/DL (ref 70–99)
GLUCOSE BLDC GLUCOMTR-MCNC: 209 MG/DL (ref 70–99)
GLUCOSE BLDC GLUCOMTR-MCNC: 241 MG/DL (ref 70–99)
GLUCOSE BLDC GLUCOMTR-MCNC: 242 MG/DL (ref 70–99)
GLUCOSE BLDC GLUCOMTR-MCNC: 249 MG/DL (ref 70–99)
GLUCOSE SERPL-MCNC: 162 MG/DL (ref 70–99)
HCT VFR BLD AUTO: 43.3 % (ref 40–53)
HGB BLD-MCNC: 13 G/DL (ref 13.3–17.7)
IMM GRANULOCYTES # BLD: 0 10E9/L (ref 0–0.4)
IMM GRANULOCYTES NFR BLD: 0.4 %
INTERPRETATION ECG - MUSE: NORMAL
LYMPHOCYTES # BLD AUTO: 2.9 10E9/L (ref 0.8–5.3)
LYMPHOCYTES NFR BLD AUTO: 32.3 %
MAGNESIUM SERPL-MCNC: 2 MG/DL (ref 1.6–2.3)
MCH RBC QN AUTO: 23.3 PG (ref 26.5–33)
MCHC RBC AUTO-ENTMCNC: 30 G/DL (ref 31.5–36.5)
MCV RBC AUTO: 78 FL (ref 78–100)
MONOCYTES # BLD AUTO: 0.9 10E9/L (ref 0–1.3)
MONOCYTES NFR BLD AUTO: 9.4 %
NEUTROPHILS # BLD AUTO: 3.9 10E9/L (ref 1.6–8.3)
NEUTROPHILS NFR BLD AUTO: 42.8 %
NRBC # BLD AUTO: 0 10*3/UL
NRBC BLD AUTO-RTO: 0 /100
PHOSPHATE SERPL-MCNC: 3.8 MG/DL (ref 2.5–4.5)
PLATELET # BLD AUTO: 245 10E9/L (ref 150–450)
POTASSIUM SERPL-SCNC: 4 MMOL/L (ref 3.4–5.3)
RBC # BLD AUTO: 5.58 10E12/L (ref 4.4–5.9)
SODIUM SERPL-SCNC: 134 MMOL/L (ref 133–144)
WBC # BLD AUTO: 9.1 10E9/L (ref 4–11)

## 2019-12-10 PROCEDURE — 97530 THERAPEUTIC ACTIVITIES: CPT | Mod: GP

## 2019-12-10 PROCEDURE — 25000132 ZZH RX MED GY IP 250 OP 250 PS 637: Performed by: NURSE PRACTITIONER

## 2019-12-10 PROCEDURE — 25000132 ZZH RX MED GY IP 250 OP 250 PS 637: Performed by: STUDENT IN AN ORGANIZED HEALTH CARE EDUCATION/TRAINING PROGRAM

## 2019-12-10 PROCEDURE — 36415 COLL VENOUS BLD VENIPUNCTURE: CPT | Performed by: NURSE PRACTITIONER

## 2019-12-10 PROCEDURE — 25000128 H RX IP 250 OP 636: Performed by: STUDENT IN AN ORGANIZED HEALTH CARE EDUCATION/TRAINING PROGRAM

## 2019-12-10 PROCEDURE — 84100 ASSAY OF PHOSPHORUS: CPT | Performed by: NURSE PRACTITIONER

## 2019-12-10 PROCEDURE — 00000146 ZZHCL STATISTIC GLUCOSE BY METER IP

## 2019-12-10 PROCEDURE — 25000131 ZZH RX MED GY IP 250 OP 636 PS 637: Performed by: STUDENT IN AN ORGANIZED HEALTH CARE EDUCATION/TRAINING PROGRAM

## 2019-12-10 PROCEDURE — 85025 COMPLETE CBC W/AUTO DIFF WBC: CPT | Performed by: NURSE PRACTITIONER

## 2019-12-10 PROCEDURE — 80048 BASIC METABOLIC PNL TOTAL CA: CPT | Performed by: NURSE PRACTITIONER

## 2019-12-10 PROCEDURE — 12000001 ZZH R&B MED SURG/OB UMMC

## 2019-12-10 PROCEDURE — 83735 ASSAY OF MAGNESIUM: CPT | Performed by: NURSE PRACTITIONER

## 2019-12-10 RX ORDER — DEXAMETHASONE 1 MG
1 TABLET ORAL EVERY 12 HOURS SCHEDULED
Status: DISCONTINUED | OUTPATIENT
Start: 2019-12-10 | End: 2019-12-10

## 2019-12-10 RX ORDER — HEPARIN SODIUM 5000 [USP'U]/.5ML
5000 INJECTION, SOLUTION INTRAVENOUS; SUBCUTANEOUS EVERY 12 HOURS
Status: DISCONTINUED | OUTPATIENT
Start: 2019-12-10 | End: 2019-12-10

## 2019-12-10 RX ADMIN — OXYCODONE HYDROCHLORIDE 10 MG: 5 TABLET ORAL at 16:51

## 2019-12-10 RX ADMIN — NIMODIPINE 60 MG: 30 CAPSULE, LIQUID FILLED ORAL at 07:43

## 2019-12-10 RX ADMIN — SENNOSIDES AND DOCUSATE SODIUM 2 TABLET: 8.6; 5 TABLET ORAL at 20:14

## 2019-12-10 RX ADMIN — ATORVASTATIN CALCIUM 20 MG: 20 TABLET, FILM COATED ORAL at 07:43

## 2019-12-10 RX ADMIN — OXYCODONE HYDROCHLORIDE 10 MG: 5 TABLET ORAL at 11:56

## 2019-12-10 RX ADMIN — INSULIN ASPART 2 UNITS: 100 INJECTION, SOLUTION INTRAVENOUS; SUBCUTANEOUS at 22:16

## 2019-12-10 RX ADMIN — ACETAMINOPHEN 650 MG: 325 TABLET, FILM COATED ORAL at 21:00

## 2019-12-10 RX ADMIN — OXYCODONE HYDROCHLORIDE 10 MG: 5 TABLET ORAL at 20:15

## 2019-12-10 RX ADMIN — METFORMIN HYDROCHLORIDE 1000 MG: 500 TABLET ORAL at 07:44

## 2019-12-10 RX ADMIN — DEXAMETHASONE 1 MG: 1 TABLET ORAL at 08:00

## 2019-12-10 RX ADMIN — ACETAMINOPHEN 650 MG: 325 TABLET, FILM COATED ORAL at 04:04

## 2019-12-10 RX ADMIN — NIMODIPINE 60 MG: 30 CAPSULE, LIQUID FILLED ORAL at 11:56

## 2019-12-10 RX ADMIN — OXYCODONE HYDROCHLORIDE 10 MG: 5 TABLET ORAL at 23:36

## 2019-12-10 RX ADMIN — METFORMIN HYDROCHLORIDE 1000 MG: 500 TABLET ORAL at 17:00

## 2019-12-10 RX ADMIN — ACETAMINOPHEN 650 MG: 325 TABLET, FILM COATED ORAL at 16:51

## 2019-12-10 RX ADMIN — POLYETHYLENE GLYCOL (3350) 17 G: 17 POWDER, FOR SOLUTION ORAL at 07:45

## 2019-12-10 RX ADMIN — NIMODIPINE 60 MG: 30 CAPSULE, LIQUID FILLED ORAL at 04:05

## 2019-12-10 RX ADMIN — LISINOPRIL 10 MG: 10 TABLET ORAL at 07:46

## 2019-12-10 RX ADMIN — ACETAMINOPHEN 650 MG: 325 TABLET, FILM COATED ORAL at 23:36

## 2019-12-10 RX ADMIN — NIMODIPINE 60 MG: 30 CAPSULE, LIQUID FILLED ORAL at 15:48

## 2019-12-10 RX ADMIN — ACETAMINOPHEN 650 MG: 325 TABLET, FILM COATED ORAL at 11:56

## 2019-12-10 RX ADMIN — OXYCODONE HYDROCHLORIDE 10 MG: 5 TABLET ORAL at 04:04

## 2019-12-10 RX ADMIN — DEXAMETHASONE 6 MG: 2 TABLET ORAL at 14:11

## 2019-12-10 RX ADMIN — OXYCODONE HYDROCHLORIDE 10 MG: 5 TABLET ORAL at 07:44

## 2019-12-10 RX ADMIN — ACETAMINOPHEN 650 MG: 325 TABLET, FILM COATED ORAL at 07:43

## 2019-12-10 RX ADMIN — SENNOSIDES AND DOCUSATE SODIUM 2 TABLET: 8.6; 5 TABLET ORAL at 07:45

## 2019-12-10 ASSESSMENT — ACTIVITIES OF DAILY LIVING (ADL)
ADLS_ACUITY_SCORE: 10
ADLS_ACUITY_SCORE: 12
ADLS_ACUITY_SCORE: 10

## 2019-12-10 NOTE — PLAN OF CARE
Discharge Planner PT   Patient plan for discharge: home with family assist as needed  Current status: Pt IND with all mobility including hallway amb x500' and stairs per home set up. Pt limited only by HA pain. Educated on importance of activity around pain tolerance to prevent functional decline as pt highly active at baseline. Pt received LE HEP and should continue hallway ambulation 3x daily during remainder of admission.  Barriers to return to prior living situation: none from mobility  Recommendations for discharge: home with assist as needed  Rationale for recommendations: pt mobilizing IND at level adequate for safe discharge home. HA pain largest mobility/activity limiting factor that is being managed medically.       Entered by: Faith Lopez 12/10/2019 4:46 PM       Physical Therapy Discharge Summary    Reason for therapy discharge:    All goals and outcomes met, no further needs identified.    Progress towards therapy goal(s). See goals on Care Plan in [facility] electronic health record for goal details.  Goals met    Therapy recommendation(s):    Continue home exercise program.

## 2019-12-10 NOTE — PLAN OF CARE
Status: Pt admitted for severe headache, photosensitivity, and nausea. Diagnosis of prepontine, premedullary cistern subarachnoid hemorrhage.  Vitals: VSS on RA.  Neuros: A&O x 4. Photophobic headache otherwise intact. Strengths 5/5. Denies N/T.  IV: PIV saline locked  Resp/trach: LS clear.  Diet: Regular diet - tolerating well.  Bowel status: No BM this shift. BS+  : Voiding spontaneously  Skin: Intact. Bruising throughout  Pain: HA controlled with oxycodone and scheduled tylenol.  Activity: Up ad rogerio.  Social: Wife at bedside, involved with cares.  Plan: Will continue to monitor and follow POC.

## 2019-12-10 NOTE — PLAN OF CARE
Pt admitted for severe HA, photosensitivity, and nausea. DX of prepontine, premedullary cistern SAH.  AVSS, N-intact except  HA (HA decreases w/ current TX),  lungs clr, BS+, VDSP, Reg diet gd po, -170, CCM W/NSR, Spouse @ bedside and attentive. SL  PLAN : Cont. POC

## 2019-12-10 NOTE — PROGRESS NOTES
"Neurosurgery Progress Note    Subjective:  transferred to floor; chest pain - cardio work-up negative, head CT stable, persistent moderate headache    Objective:  /74   Pulse 103   Temp 98.4  F (36.9  C) (Axillary)   Resp 18   Ht 1.676 m (5' 6\")   Wt 91.2 kg (201 lb 1.6 oz)   SpO2 97%   BMI 32.46 kg/m    CV: Rate as above  PULM: breathing comfortably on room air  NEUROLOGIC:  -- Awake; Alert; oriented x 3  -- Follows commands briskly  -- Speech fluent, spontaneous. No aphasia or dysarthria.  -- no gaze preference. No apparent hemineglect.  Cranial Nerves: 2-12 intact    Motor:  Normal bulk / tone; no tremor, rigidity, or bradykinesia.  No muscle wasting or fasciculations  No Pronator Drift  5/5 strength in all 4 extremities  Sensory:  intact to LT x 4 extremities     Assessment:  Mr. Harmon is a 43 yo M with PMH asthma, HTN, DM2 transferred from [facility] to [facility] after post-coital severe headache, photosensitivity, nausea/vomiting, found to have small prepontine, premedullary cistern subarachnoid hemorrhage, extending into the ventral upper spinal canal. Chen Carson 1, modified Amaya 1. Neurologically intact. CTA head/neck with no aneurysm or vascular malformation identified. Angiogram negative for aneurysm.    Plan:  - SBP < 140  - TCD, nimodipine  - regular diet  - sliding scale insulin high dosing; resumed pta glipizide, metformin, increased lantus  - PT: home with outpatient PT  - Discharge home pending headache control    -----------------------------------  Clemente Vidales MD  Neurosurgery Resident PGY2    Please contact neurosurgery resident on call with questions.    Dial * * [phone], enter [phone] when prompted.     "

## 2019-12-10 NOTE — PLAN OF CARE
Status: Pt admitted for severe headache, photosensitivity, and nausea. Diagnosis of prepontine, premedullary cistern subarachnoid hemorrhage. Transferred to unit today for cardiac monitoring.  Vitals: VSS, goal of SBP < 140 met this shift  Neuros: A&O x 4. Photophobic headache, controlled with tylenol and oxycodone.  IV: PIV saline locked  Resp/trach: No issues. RA. Denies SOB.  Diet: Regular. Denies N/V.   Bowel status: Last BM 12/9.   : Voiding spontaneously  Skin: Intact. Bruising throughout  Pain: 4/10 headache  Activity: SBA  Social: Wife at bedside, helps with cares  Plan: Will continue to monitor.

## 2019-12-11 LAB
ALBUMIN SERPL-MCNC: 3.9 G/DL (ref 3.4–5)
ALP SERPL-CCNC: 106 U/L (ref 40–150)
ALT SERPL W P-5'-P-CCNC: 74 U/L (ref 0–70)
ANION GAP SERPL CALCULATED.3IONS-SCNC: 6 MMOL/L (ref 3–14)
AST SERPL W P-5'-P-CCNC: 27 U/L (ref 0–45)
BASOPHILS # BLD AUTO: 0 10E9/L (ref 0–0.2)
BASOPHILS NFR BLD AUTO: 0.1 %
BILIRUB DIRECT SERPL-MCNC: 0.1 MG/DL (ref 0–0.2)
BILIRUB SERPL-MCNC: 0.5 MG/DL (ref 0.2–1.3)
BUN SERPL-MCNC: 18 MG/DL (ref 7–30)
CALCIUM SERPL-MCNC: 9.3 MG/DL (ref 8.5–10.1)
CHLORIDE SERPL-SCNC: 100 MMOL/L (ref 94–109)
CO2 SERPL-SCNC: 25 MMOL/L (ref 20–32)
CREAT SERPL-MCNC: 0.62 MG/DL (ref 0.66–1.25)
DIFFERENTIAL METHOD BLD: ABNORMAL
EOSINOPHIL # BLD AUTO: 0 10E9/L (ref 0–0.7)
EOSINOPHIL NFR BLD AUTO: 0.2 %
ERYTHROCYTE [DISTWIDTH] IN BLOOD BY AUTOMATED COUNT: 14.5 % (ref 10–15)
GFR SERPL CREATININE-BSD FRML MDRD: >90 ML/MIN/{1.73_M2}
GLUCOSE BLDC GLUCOMTR-MCNC: 223 MG/DL (ref 70–99)
GLUCOSE BLDC GLUCOMTR-MCNC: 229 MG/DL (ref 70–99)
GLUCOSE BLDC GLUCOMTR-MCNC: 276 MG/DL (ref 70–99)
GLUCOSE BLDC GLUCOMTR-MCNC: 301 MG/DL (ref 70–99)
GLUCOSE SERPL-MCNC: 226 MG/DL (ref 70–99)
HCT VFR BLD AUTO: 43.8 % (ref 40–53)
HGB BLD-MCNC: 13.5 G/DL (ref 13.3–17.7)
IMM GRANULOCYTES # BLD: 0.1 10E9/L (ref 0–0.4)
IMM GRANULOCYTES NFR BLD: 0.4 %
LYMPHOCYTES # BLD AUTO: 1.9 10E9/L (ref 0.8–5.3)
LYMPHOCYTES NFR BLD AUTO: 13.8 %
MAGNESIUM SERPL-MCNC: 2 MG/DL (ref 1.6–2.3)
MCH RBC QN AUTO: 23.4 PG (ref 26.5–33)
MCHC RBC AUTO-ENTMCNC: 30.8 G/DL (ref 31.5–36.5)
MCV RBC AUTO: 76 FL (ref 78–100)
MONOCYTES # BLD AUTO: 0.7 10E9/L (ref 0–1.3)
MONOCYTES NFR BLD AUTO: 5.1 %
NEUTROPHILS # BLD AUTO: 10.9 10E9/L (ref 1.6–8.3)
NEUTROPHILS NFR BLD AUTO: 80.4 %
NRBC # BLD AUTO: 0 10*3/UL
NRBC BLD AUTO-RTO: 0 /100
PHOSPHATE SERPL-MCNC: 3.9 MG/DL (ref 2.5–4.5)
PLATELET # BLD AUTO: 272 10E9/L (ref 150–450)
POTASSIUM SERPL-SCNC: 4.2 MMOL/L (ref 3.4–5.3)
PROT SERPL-MCNC: 8.6 G/DL (ref 6.8–8.8)
RBC # BLD AUTO: 5.78 10E12/L (ref 4.4–5.9)
SODIUM SERPL-SCNC: 131 MMOL/L (ref 133–144)
SODIUM SERPL-SCNC: 133 MMOL/L (ref 133–144)
WBC # BLD AUTO: 13.6 10E9/L (ref 4–11)

## 2019-12-11 PROCEDURE — 00000146 ZZHCL STATISTIC GLUCOSE BY METER IP

## 2019-12-11 PROCEDURE — 25000125 ZZHC RX 250: Performed by: NURSE PRACTITIONER

## 2019-12-11 PROCEDURE — 80076 HEPATIC FUNCTION PANEL: CPT | Performed by: NURSE PRACTITIONER

## 2019-12-11 PROCEDURE — 80048 BASIC METABOLIC PNL TOTAL CA: CPT | Performed by: NURSE PRACTITIONER

## 2019-12-11 PROCEDURE — 36415 COLL VENOUS BLD VENIPUNCTURE: CPT | Performed by: STUDENT IN AN ORGANIZED HEALTH CARE EDUCATION/TRAINING PROGRAM

## 2019-12-11 PROCEDURE — 36415 COLL VENOUS BLD VENIPUNCTURE: CPT | Performed by: NURSE PRACTITIONER

## 2019-12-11 PROCEDURE — 84100 ASSAY OF PHOSPHORUS: CPT | Performed by: NURSE PRACTITIONER

## 2019-12-11 PROCEDURE — 25000132 ZZH RX MED GY IP 250 OP 250 PS 637: Performed by: NURSE PRACTITIONER

## 2019-12-11 PROCEDURE — 25000132 ZZH RX MED GY IP 250 OP 250 PS 637: Performed by: STUDENT IN AN ORGANIZED HEALTH CARE EDUCATION/TRAINING PROGRAM

## 2019-12-11 PROCEDURE — 12000001 ZZH R&B MED SURG/OB UMMC

## 2019-12-11 PROCEDURE — 85025 COMPLETE CBC W/AUTO DIFF WBC: CPT | Performed by: NURSE PRACTITIONER

## 2019-12-11 PROCEDURE — 84295 ASSAY OF SERUM SODIUM: CPT | Performed by: STUDENT IN AN ORGANIZED HEALTH CARE EDUCATION/TRAINING PROGRAM

## 2019-12-11 PROCEDURE — 25800030 ZZH RX IP 258 OP 636: Performed by: NURSE PRACTITIONER

## 2019-12-11 PROCEDURE — 83735 ASSAY OF MAGNESIUM: CPT | Performed by: NURSE PRACTITIONER

## 2019-12-11 RX ORDER — POLYETHYLENE GLYCOL 3350 17 G/17G
17 POWDER, FOR SOLUTION ORAL 2 TIMES DAILY
Qty: 14 PACKET | Refills: 0 | Status: SHIPPED | OUTPATIENT
Start: 2019-12-11

## 2019-12-11 RX ORDER — AMOXICILLIN 250 MG
1-2 CAPSULE ORAL 2 TIMES DAILY
Qty: 30 TABLET | Refills: 0 | Status: SHIPPED | OUTPATIENT
Start: 2019-12-11

## 2019-12-11 RX ORDER — DIVALPROEX SODIUM 250 MG/1
250 TABLET, DELAYED RELEASE ORAL EVERY 12 HOURS SCHEDULED
Status: DISCONTINUED | OUTPATIENT
Start: 2019-12-11 | End: 2019-12-13 | Stop reason: HOSPADM

## 2019-12-11 RX ORDER — OXYCODONE HYDROCHLORIDE 5 MG/1
5-10 TABLET ORAL EVERY 4 HOURS PRN
Qty: 50 TABLET | Refills: 0 | Status: SHIPPED | OUTPATIENT
Start: 2019-12-11 | End: 2019-12-13

## 2019-12-11 RX ADMIN — ACETAMINOPHEN 650 MG: 325 TABLET, FILM COATED ORAL at 23:13

## 2019-12-11 RX ADMIN — OXYCODONE HYDROCHLORIDE 10 MG: 5 TABLET ORAL at 12:13

## 2019-12-11 RX ADMIN — METFORMIN HYDROCHLORIDE 1000 MG: 500 TABLET ORAL at 17:03

## 2019-12-11 RX ADMIN — ACETAMINOPHEN 650 MG: 325 TABLET, FILM COATED ORAL at 12:13

## 2019-12-11 RX ADMIN — VALPROATE SODIUM 500 MG: 100 INJECTION, SOLUTION INTRAVENOUS at 09:41

## 2019-12-11 RX ADMIN — OXYCODONE HYDROCHLORIDE 10 MG: 5 TABLET ORAL at 07:55

## 2019-12-11 RX ADMIN — INSULIN ASPART 5 UNITS: 100 INJECTION, SOLUTION INTRAVENOUS; SUBCUTANEOUS at 23:13

## 2019-12-11 RX ADMIN — DIVALPROEX SODIUM 250 MG: 250 TABLET, DELAYED RELEASE ORAL at 20:16

## 2019-12-11 RX ADMIN — METFORMIN HYDROCHLORIDE 1000 MG: 500 TABLET ORAL at 07:55

## 2019-12-11 RX ADMIN — ACETAMINOPHEN 650 MG: 325 TABLET, FILM COATED ORAL at 03:58

## 2019-12-11 RX ADMIN — OXYCODONE HYDROCHLORIDE 10 MG: 5 TABLET ORAL at 03:58

## 2019-12-11 RX ADMIN — LISINOPRIL 10 MG: 10 TABLET ORAL at 07:55

## 2019-12-11 RX ADMIN — POLYETHYLENE GLYCOL (3350) 17 G: 17 POWDER, FOR SOLUTION ORAL at 07:56

## 2019-12-11 RX ADMIN — ACETAMINOPHEN 650 MG: 325 TABLET, FILM COATED ORAL at 07:54

## 2019-12-11 RX ADMIN — SENNOSIDES AND DOCUSATE SODIUM 2 TABLET: 8.6; 5 TABLET ORAL at 07:54

## 2019-12-11 RX ADMIN — OXYCODONE HYDROCHLORIDE 10 MG: 5 TABLET ORAL at 15:52

## 2019-12-11 RX ADMIN — SENNOSIDES AND DOCUSATE SODIUM 2 TABLET: 8.6; 5 TABLET ORAL at 20:16

## 2019-12-11 RX ADMIN — ATORVASTATIN CALCIUM 20 MG: 20 TABLET, FILM COATED ORAL at 07:54

## 2019-12-11 RX ADMIN — OXYCODONE HYDROCHLORIDE 10 MG: 5 TABLET ORAL at 20:16

## 2019-12-11 RX ADMIN — ACETAMINOPHEN 650 MG: 325 TABLET, FILM COATED ORAL at 16:53

## 2019-12-11 ASSESSMENT — ACTIVITIES OF DAILY LIVING (ADL)
ADLS_ACUITY_SCORE: 12
ADLS_ACUITY_SCORE: 10
ADLS_ACUITY_SCORE: 12

## 2019-12-11 NOTE — DISCHARGE SUMMARY
[facility] Discharge Summary and Instructions    Vanesa Harmon MRN# [id number]   Age: 44 year old YOB: 1975     Date of Admission:  12/6/2019  Date of Discharge::  12/13/2019   Admitting Physician:  Papa Altamirano MD  Discharge Physician:  Papa Altamirano MD          Admission Diagnoses:   Acute spontaneous subarachnoid intracranial hemorrhage (H) [I60.9]          Discharge Diagnosis:     Acute spontaneous subarachnoid intracranial hemorrhage (H) [I60.9]          Procedures:   EXAM: Diagnostic Angiogram   DATE/TIME: 12/6/2019            Brief History of Illness:   Patient has elected to undergo above-mentioned procedure.           Hospital Course:   Mr. Harmon is a 45 yo M with PMH asthma, HTN, DM2 transferred from [facility] to [facility] after post-coital severe headache, photosensitivity, nausea/vomiting, found to have small prepontine, premedullary cistern subarachnoid hemorrhage, extending into the ventral upper spinal canal. Chen Carson 1, modified Amaya 1. Neurologically intact. He underwent CTA head/neck with no aneurysm or vascular malformation identified. Angiogram negative for aneurysm. Cardiac workup negative.  Patient c/o moderate H/A. He was given Decadron 6mg x 1 with improvement of headache.  Will discharge home on Depakote  mg Q12h for headache. Evaluated by PT, tolerating regular diet, pain controlled, and deemed appropriate for discharge home on 12/13/2019.               Discharge Medications:     Current Discharge Medication List      START taking these medications    Details   divalproex sodium delayed-release (DEPAKOTE) 250 MG DR tablet Take 1 tablet (250 mg) by mouth every 12 hours  Qty: 60 tablet, Refills: 1    Associated Diagnoses: Subarachnoid hemorrhage (H)      polyethylene glycol (MIRALAX/GLYCOLAX) packet 17 g by Oral or Feeding Tube route 2 times daily  Qty: 14 packet, Refills: 0    Associated Diagnoses: Subarachnoid hemorrhage (H)     "  senna-docusate (SENOKOT-S/PERICOLACE) 8.6-50 MG tablet Take 1-2 tablets by mouth 2 times daily  Qty: 30 tablet, Refills: 0    Associated Diagnoses: Subarachnoid hemorrhage (H)         CONTINUE these medications which have CHANGED    Details   glipiZIDE (GLUCOTROL XL) 5 MG 24 hr tablet Take 1 tablet (5 mg) by mouth daily  Qty: 30 tablet, Refills: 1    Associated Diagnoses: Subarachnoid hemorrhage (H)      insulin glargine (LANTUS PEN) 100 UNIT/ML pen Inject 15 Units Subcutaneous every evening  Qty: 6 mL, Refills: 1    Comments: If Lantus is not covered by insurance, may substitute Basaglar at same dose and frequency.    Associated Diagnoses: Subarachnoid hemorrhage (H)         CONTINUE these medications which have NOT CHANGED    Details   albuterol (PROAIR HFA/PROVENTIL HFA/VENTOLIN HFA) 108 (90 Base) MCG/ACT inhaler Inhale 2 puffs into the lungs 4 times daily as needed for shortness of breath / dyspnea or wheezing    Comments: Pharmacy may dispense brand covered by insurance (Proair, or proventil or ventolin or generic albuterol inhaler)      atorvastatin (LIPITOR) 20 MG tablet Take 20 mg by mouth daily      lisinopril (PRINIVIL/ZESTRIL) 10 MG tablet Take 1 tablet by mouth daily      metFORMIN (GLUCOPHAGE) 1000 MG tablet Take 1 tablet by mouth 2 times daily (with meals)         Exam:   Physical Exam  /76   Pulse 78   Temp 97.1  F (36.2  C) (Oral)   Resp 16   Ht 1.676 m (5' 6\")   Wt 91.2 kg (201 lb 1.6 oz)   SpO2 99%   BMI 32.46 kg/m    General: Appears comfortable, NAD  Wound: Incision, clean, dry, intact without strikethrough  Neurologic Exam:  - AOx3.  - Follows commands.  - Speech fluent, spontaneous. No aphasia or dysarthria.  - No gaze preference. No apparent hemineglect.  - PERRL, EOMI.  - Face symmetric with sensation intact to light touch.  - Palate elevates symmetrically, uvula midline, tongue protrudes midline.  - Trapezii and sternocleidomastoid muscles 5/5 bilaterally.  - No pronator " drift.  Motor: Normal bulk/tone; no tremor, rigidity, or bradykinesia.   Right:  Deltoid 5/5, tricep 5/5, bicep 5/5, wrist flexor 5/5, wrist extensor 5/5, finger intrinsic 5/5  Left:  Deltoid 5/5, tricep 5/5, bicep 5/5, wrist flexor 5/5, wrist extensor 5/5, finger intrinsic 5/5  Right: Iliopsoas 5/5, quadricep 5/5, hamstring 5/5, tibialis anterior 5/5, gastrocnemius 5/5, EHL 5/5  Left:  Iliopsoas 5/5, quadricep 5/5, hamstring 5/5, tibialis anterior 5/5, gastrocnemius 5/5, EHL 5/5    Sensation intact in bilateral L4-S1 dermatones              Discharge Instructions and Follow-Up:     Discharge diet: Regular   Discharge activity: You may advance activity as tolerated. No strenuous exercise or heay lifting greater than 10 lbs for 4 weeks or until seen and cleared in clinic.   Discharge follow-up:   Follow-up with PCP in one week for reassessment and management of Diabetes and hypertension    Follow-up with Neurosurgery [facility] in 2 weeks with head CT to evaluate for delayed onset hydrocephalus     Follow-up with Dr. Altamirano in 6 months in Neurosurgery clinic with MRI brain and orbit w/wo contrast      Wound care: Ok to shower,however no scrubbing of the wound and no soaking of the wound, meaning no bathtubs or swimming pools. Pat dry only. Leave wound open to air.  Patient to have wound checked 2 weeks following surgery.       Please call if you have:  1. increased pain, redness, drainage, swelling at your incision  2. fevers > 101.5 F degrees  3. with any questions or concerns.  You may reach the Neurosurgery clinic at [phone] during regular work hours. ER at [phone].    and ask for the Neurosurgery Resident on call at [phone], during off hours or weekends.         Discharge Disposition:     Discharged to home        Cathleen Hoyt DNP  Neurosurgery Nurse Practitioner  [phone]

## 2019-12-11 NOTE — PLAN OF CARE
"Pt admitted for severe HA, photosensitivity, and nausea. DX of prepontine, premedullary cistern SAH.  AVSS, N-intact except  HA (HA decreases w/ current TX), Depacon IV given x 1  for HA (overall pt states his HA is better today then yesterday rates it \"3-6\"), lungs clr, BS+, VDSP, Reg diet gd po, , CCM W/NSR . SL  PLAN : Cont. POC, monitor pain control.  "

## 2019-12-11 NOTE — PROGRESS NOTES
"Neurosurgery Progress Note    Subjective:  decadron 6mg x1 given for headache, with improvement in headache    Objective:  /81 (BP Location: Left arm)   Pulse 68   Temp 98.2  F (36.8  C) (Oral)   Resp 16   Ht 1.676 m (5' 6\")   Wt 91.2 kg (201 lb 1.6 oz)   SpO2 97%   BMI 32.46 kg/m    Alert, oriented x3  CN 2-12 intact  5/5 strength in all extremities, no pronator drift  Sensation intact to light touch    Assessment:  Mr. Harmon is a 43 yo M with PMH asthma, HTN, DM2 transferred from [facility] to [facility] after post-coital severe headache, photosensitivity, nausea/vomiting, found to have small prepontine, premedullary cistern subarachnoid hemorrhage, extending into the ventral upper spinal canal. Chen Carson 1, modified Amaya 1. Neurologically intact. CTA head/neck with no aneurysm or vascular malformation identified. Angiogram negative for aneurysm.    Plan:  - SBP < 140  - TCD, nimodipine  - regular diet  - sliding scale insulin high dosing; resumed pta glipizide, metformin, increased lantus; consider endocrine consult  - PT: home with outpatient PT  - Discharge home pending headache control, may try depakote    -----------------------------------  Clemente Vidales MD  Neurosurgery Resident PGY2    Please contact neurosurgery resident on call with questions.    Dial * * [phone], enter [phone] when prompted.     "

## 2019-12-12 LAB
ANION GAP SERPL CALCULATED.3IONS-SCNC: 4 MMOL/L (ref 3–14)
BASOPHILS # BLD AUTO: 0 10E9/L (ref 0–0.2)
BASOPHILS NFR BLD AUTO: 0.4 %
BUN SERPL-MCNC: 16 MG/DL (ref 7–30)
CALCIUM SERPL-MCNC: 8.9 MG/DL (ref 8.5–10.1)
CHLORIDE SERPL-SCNC: 104 MMOL/L (ref 94–109)
CO2 SERPL-SCNC: 29 MMOL/L (ref 20–32)
CREAT SERPL-MCNC: 0.75 MG/DL (ref 0.66–1.25)
DIFFERENTIAL METHOD BLD: ABNORMAL
EOSINOPHIL # BLD AUTO: 0.9 10E9/L (ref 0–0.7)
EOSINOPHIL NFR BLD AUTO: 8.4 %
ERYTHROCYTE [DISTWIDTH] IN BLOOD BY AUTOMATED COUNT: 15 % (ref 10–15)
GFR SERPL CREATININE-BSD FRML MDRD: >90 ML/MIN/{1.73_M2}
GLUCOSE BLDC GLUCOMTR-MCNC: 103 MG/DL (ref 70–99)
GLUCOSE BLDC GLUCOMTR-MCNC: 162 MG/DL (ref 70–99)
GLUCOSE BLDC GLUCOMTR-MCNC: 186 MG/DL (ref 70–99)
GLUCOSE BLDC GLUCOMTR-MCNC: 211 MG/DL (ref 70–99)
GLUCOSE BLDC GLUCOMTR-MCNC: 221 MG/DL (ref 70–99)
GLUCOSE BLDC GLUCOMTR-MCNC: 96 MG/DL (ref 70–99)
GLUCOSE SERPL-MCNC: 188 MG/DL (ref 70–99)
HCT VFR BLD AUTO: 38.9 % (ref 40–53)
HGB BLD-MCNC: 11.9 G/DL (ref 13.3–17.7)
IMM GRANULOCYTES # BLD: 0 10E9/L (ref 0–0.4)
IMM GRANULOCYTES NFR BLD: 0.4 %
LYMPHOCYTES # BLD AUTO: 3.7 10E9/L (ref 0.8–5.3)
LYMPHOCYTES NFR BLD AUTO: 34.2 %
MAGNESIUM SERPL-MCNC: 1.9 MG/DL (ref 1.6–2.3)
MCH RBC QN AUTO: 23.8 PG (ref 26.5–33)
MCHC RBC AUTO-ENTMCNC: 30.6 G/DL (ref 31.5–36.5)
MCV RBC AUTO: 78 FL (ref 78–100)
MONOCYTES # BLD AUTO: 0.8 10E9/L (ref 0–1.3)
MONOCYTES NFR BLD AUTO: 7.8 %
NEUTROPHILS # BLD AUTO: 5.3 10E9/L (ref 1.6–8.3)
NEUTROPHILS NFR BLD AUTO: 48.8 %
NRBC # BLD AUTO: 0 10*3/UL
NRBC BLD AUTO-RTO: 0 /100
PHOSPHATE SERPL-MCNC: 3.6 MG/DL (ref 2.5–4.5)
PLATELET # BLD AUTO: 228 10E9/L (ref 150–450)
POTASSIUM SERPL-SCNC: 4.5 MMOL/L (ref 3.4–5.3)
RBC # BLD AUTO: 4.99 10E12/L (ref 4.4–5.9)
SODIUM SERPL-SCNC: 137 MMOL/L (ref 133–144)
WBC # BLD AUTO: 10.8 10E9/L (ref 4–11)

## 2019-12-12 PROCEDURE — 25000132 ZZH RX MED GY IP 250 OP 250 PS 637: Performed by: NURSE PRACTITIONER

## 2019-12-12 PROCEDURE — 85025 COMPLETE CBC W/AUTO DIFF WBC: CPT | Performed by: NURSE PRACTITIONER

## 2019-12-12 PROCEDURE — 25000131 ZZH RX MED GY IP 250 OP 636 PS 637: Performed by: NURSE PRACTITIONER

## 2019-12-12 PROCEDURE — 80048 BASIC METABOLIC PNL TOTAL CA: CPT | Performed by: NURSE PRACTITIONER

## 2019-12-12 PROCEDURE — 84100 ASSAY OF PHOSPHORUS: CPT | Performed by: NURSE PRACTITIONER

## 2019-12-12 PROCEDURE — 12000001 ZZH R&B MED SURG/OB UMMC

## 2019-12-12 PROCEDURE — 25000132 ZZH RX MED GY IP 250 OP 250 PS 637: Performed by: STUDENT IN AN ORGANIZED HEALTH CARE EDUCATION/TRAINING PROGRAM

## 2019-12-12 PROCEDURE — 36415 COLL VENOUS BLD VENIPUNCTURE: CPT | Performed by: NURSE PRACTITIONER

## 2019-12-12 PROCEDURE — 00000146 ZZHCL STATISTIC GLUCOSE BY METER IP

## 2019-12-12 PROCEDURE — 25000132 ZZH RX MED GY IP 250 OP 250 PS 637: Performed by: CLINICAL NURSE SPECIALIST

## 2019-12-12 PROCEDURE — 83735 ASSAY OF MAGNESIUM: CPT | Performed by: NURSE PRACTITIONER

## 2019-12-12 RX ORDER — GLIPIZIDE 2.5 MG/1
2.5 TABLET, EXTENDED RELEASE ORAL DAILY
Status: DISCONTINUED | OUTPATIENT
Start: 2019-12-12 | End: 2019-12-12

## 2019-12-12 RX ORDER — GLIPIZIDE 2.5 MG/1
2.5 TABLET, EXTENDED RELEASE ORAL ONCE
Status: COMPLETED | OUTPATIENT
Start: 2019-12-12 | End: 2019-12-12

## 2019-12-12 RX ORDER — GLIPIZIDE 5 MG/1
5 TABLET, FILM COATED, EXTENDED RELEASE ORAL DAILY
Status: DISCONTINUED | OUTPATIENT
Start: 2019-12-13 | End: 2019-12-13 | Stop reason: HOSPADM

## 2019-12-12 RX ADMIN — GLIPIZIDE 2.5 MG: 2.5 TABLET, FILM COATED, EXTENDED RELEASE ORAL at 11:11

## 2019-12-12 RX ADMIN — SENNOSIDES AND DOCUSATE SODIUM 1 TABLET: 8.6; 5 TABLET ORAL at 19:52

## 2019-12-12 RX ADMIN — ACETAMINOPHEN 650 MG: 325 TABLET, FILM COATED ORAL at 04:03

## 2019-12-12 RX ADMIN — LISINOPRIL 10 MG: 10 TABLET ORAL at 08:12

## 2019-12-12 RX ADMIN — OXYCODONE HYDROCHLORIDE 10 MG: 5 TABLET ORAL at 08:11

## 2019-12-12 RX ADMIN — OXYCODONE HYDROCHLORIDE 10 MG: 5 TABLET ORAL at 16:24

## 2019-12-12 RX ADMIN — GLIPIZIDE 2.5 MG: 2.5 TABLET, FILM COATED, EXTENDED RELEASE ORAL at 08:11

## 2019-12-12 RX ADMIN — DIVALPROEX SODIUM 250 MG: 250 TABLET, DELAYED RELEASE ORAL at 19:52

## 2019-12-12 RX ADMIN — ACETAMINOPHEN 650 MG: 325 TABLET, FILM COATED ORAL at 08:12

## 2019-12-12 RX ADMIN — METFORMIN HYDROCHLORIDE 1000 MG: 500 TABLET ORAL at 08:11

## 2019-12-12 RX ADMIN — ACETAMINOPHEN 650 MG: 325 TABLET, FILM COATED ORAL at 12:27

## 2019-12-12 RX ADMIN — POLYETHYLENE GLYCOL (3350) 17 G: 17 POWDER, FOR SOLUTION ORAL at 19:55

## 2019-12-12 RX ADMIN — INSULIN ASPART 1 UNITS: 100 INJECTION, SOLUTION INTRAVENOUS; SUBCUTANEOUS at 22:14

## 2019-12-12 RX ADMIN — POLYETHYLENE GLYCOL (3350) 17 G: 17 POWDER, FOR SOLUTION ORAL at 08:12

## 2019-12-12 RX ADMIN — OXYCODONE HYDROCHLORIDE 10 MG: 5 TABLET ORAL at 12:27

## 2019-12-12 RX ADMIN — OXYCODONE HYDROCHLORIDE 10 MG: 5 TABLET ORAL at 04:03

## 2019-12-12 RX ADMIN — DIVALPROEX SODIUM 250 MG: 250 TABLET, DELAYED RELEASE ORAL at 08:12

## 2019-12-12 RX ADMIN — SENNOSIDES AND DOCUSATE SODIUM 2 TABLET: 8.6; 5 TABLET ORAL at 08:12

## 2019-12-12 RX ADMIN — ATORVASTATIN CALCIUM 20 MG: 20 TABLET, FILM COATED ORAL at 08:13

## 2019-12-12 RX ADMIN — ACETAMINOPHEN 650 MG: 325 TABLET, FILM COATED ORAL at 19:52

## 2019-12-12 RX ADMIN — METFORMIN HYDROCHLORIDE 1000 MG: 500 TABLET ORAL at 19:52

## 2019-12-12 RX ADMIN — ACETAMINOPHEN 650 MG: 325 TABLET, FILM COATED ORAL at 16:24

## 2019-12-12 ASSESSMENT — ACTIVITIES OF DAILY LIVING (ADL)
ADLS_ACUITY_SCORE: 12

## 2019-12-12 NOTE — DISCHARGE INSTRUCTIONS
Diabetes management.  Lab Results   Component Value Date    A1C 11.5 12/06/2019    A1C 6.4 06/19/2014     A1C was 10.8 10/4/2019 at [facility].  Your goal is to improve A1C back to the 7% range  Increase in exercise and decrease in carbohydrate intake will be key to improving glucose control.  Glipizide XL increased to 5 mg every morning   Lantus 15 units every night at bedtime  Metformin 1 gram two times per day  Check your blood sugar before meals and bedtime.    When you follow up with Dr. Marie, ask to discuss two alternative diabetes medications  1. GLP-1 agonist  2. SGLT-2 inhibitor  Both of these medications have beneficial side effects.  One of them might be appropriate for you (medically and in terms of budget), but might need prior insurance approval.

## 2019-12-12 NOTE — PLAN OF CARE
Status: Pt admitted for severe headache, photosensitivity, and nausea. Diagnosis of prepontine, premedullary cistern subarachnoid hemorrhage.  Vitals: VSS on RA.  Neuros: A&O x4. Photophobic headache improving, otherwise intact. Strengths 5/5. Denies N/T.  IV: PIV SL.  Resp/trach: LS clear.  Diet: Regular diet - tolerating well.  Bowel status: No BM this shift. BS+  : Voiding spontaneously  Skin: Intact. Bruising throughout  Pain: HA controlled with oxycodone and scheduled tylenol.  Activity: Up ad rogerio.  Social: No visitors this shift.  Plan: Possible discharge home today. Will continue to monitor and follow POC.

## 2019-12-12 NOTE — CONSULTS
"Diabetes/Hyperglycemia Management Consult    Chief Complaint ongoing hyperglycemia  Consult requested by: Kendra Givens CNP  History of Present Illness Vanesa Harmon is a 44 year old male with past medical history of hypertension, asthma, fatty liver, GERD, and type 2 diabetes hospitalized after post-coital severe headache with photosensitivity, nausea/vomiting and found to have prepontine, pre medullary cistern subarachnoid hemorrhage.    Vanesa's outpatient glucose control has been poor lately.  In October, when A1C was 10.8% his PCP added glipizide XL 2.5 mg to his metformin 1 gm BID.  Not long after that, he restarted on glargine 11 units at night.  BGs are usually 200s and up to 300s at home.    Hyperglycemic on admission and persisted despite restart glargine, metformin.  Glargine up to 15 units and w/ metformin BG best 160-280.  Steroids given  and hyperglycemia worsened further, 300s.  Today, with glargine 20 units on board, fasting BG 160s.  Glipizide 2.5 mg resumed this AM.  Vanesa has been eating well for the last 4 days, including food from home.  He describes his usual eating habits as \"crazy\" (high in carb, and too much, d/t working hard and getting very hungry).  He cites his father as a good diabetes \"advisor\" and example.  He notes he has a brother who  from undiagnosed diabetes (in home country).  Complains of ongoing headache.    Recent Labs   Lab 19  1221 19  0752 19  0648 19  2230 19  1656 19  1217 19  0744 19  0639  12/10/19  0718  19  0354  19  0409  19  0415   GLC  --   --  188*  --   --   --   --  226*  --  162*  --  161*  --  208*  --  227*   * 162*  --  301* 276* 223* 229*  --    < >  --    < >  --    < >  --    < >  --     < > = values in this interval not displayed.         Diabetes Type: 2  Diabetes Duration: 4 years  Usual Diabetes Regimen:   Before meals BG monitoring frequency  Walks 30 min " 3x/week  Unrestricted diet  Metformin 1 gm BID  glargine 11 units at HS  Glipizide XL 2.5 mg around 0400 when starts work  Ability to [place] Prescribed Regimen: fair  Diabetes Control:   Lab Results   Component Value Date    A1C 11.5 12/06/2019    A1C 6.4 06/19/2014     Diabetes Complications: denies microvascular complications, last eye exam last year  History of DKA: no  Able to Detect Hypoglycemia: has never experienced  Usual Diabetes Care Provider: Dr. Marie, [facility]  Factors Impacting Glucose Control: steroid, obesity, high carb intake, illness      Review of Systems  10 point ROS completed with pertinent positives and negatives noted in the HPI    Past medical, family and social histories are reviewed and updated.    Past Medical History  Past Medical History:   Diagnosis Date     Asthma      SAH (subarachnoid hemorrhage) 12/05/2019    during intercourse     Type 2 diabetes mellitus        Family History  Family History   Problem Relation Age of Onset     Cancer Mother         leukemia     Diabetes Father      Hypertension Father      Cancer Brother         leukemia    Also type 2 diabetes in brother    Social History  Social History     Socioeconomic History     Marital status:      Spouse name: None     Number of children: 2     Years of education: None     Highest education level: None   Occupational History     Employer: [employer]   Social Needs     Financial resource strain: None     Food insecurity:     Worry: None     Inability: None     Transportation needs:     Medical: None     Non-medical: None   Tobacco Use     Smoking status: Current Some Day Smoker     Packs/day: 0.25     Types: Cigarettes     Smokeless tobacco: Current User   Substance and Sexual Activity     Alcohol use: Yes     Comment: very rare     Drug use: No     Sexual activity: Yes     Partners: Female   Lifestyle     Physical activity:     Days per week: None     Minutes per session: None     Stress: None   Relationships      Social connections:     Talks on phone: None     Gets together: None     Attends Jainism service: None     Active member of club or organization: None     Attends meetings of clubs or organizations: None     Relationship status: None     Intimate partner violence:     Fear of current or ex partner: None     Emotionally abused: None     Physically abused: None     Forced sexual activity: None   Other Topics Concern      Service Not Asked     Blood Transfusions No     Caffeine Concern No     Occupational Exposure No     Comment: dust at work , chemicals     Hobby Hazards No     Sleep Concern Yes     Comment: snores      Stress Concern No     Weight Concern No     Comment: weight gain      Special Diet No     Back Care Yes     Comment: occ lBP     Exercise Yes     Comment: work is physical      Bike Helmet Not Asked     Seat Belt Not Asked     Self-Exams Not Asked   Social History Narrative     Works in a [employer]         Physical Exam  Temp: 98  F (36.7  C) Temp src: Oral BP: 120/88 Pulse: 64 Heart Rate: 69 Resp: 16 SpO2: 100 % O2 Device: None (Room air)    Wt Readings from Last 4 Encounters:   12/09/19 91.2 kg (201 lb 1.6 oz)   06/19/14 95.7 kg (211 lb)   02/26/13 94.4 kg (208 lb 1.6 oz)       General:  pleasant man resting in bed, in no distress, but not comfortable  HEENT: NC/AT, PER and anicteric, non-injected, oral mucous membranes moist.   Lungs: unlabored respiration,  no cough  ABD: central obesity  Skin: warm and dry, no obvious lesions  MSK:  fluid movement of all extremities  Lymp:  no LE edema   Mental status:  alert, oriented x3, communicating clearly, closing eyes for comfort it appears  Psych:  calm, even mood    Laboratory  Recent Labs   Lab Test 12/12/19  0648 12/11/19  1302 12/11/19  0639    133 131*   POTASSIUM 4.5  --  4.2   CHLORIDE 104  --  100   CO2 29  --  25   ANIONGAP 4  --  6   *  --  226*   BUN 16  --  18   CR 0.75  --  0.62*   VEL 8.9   --  9.3     CBC RESULTS:   Recent Labs   Lab Test 12/12/19  0648   WBC 10.8   RBC 4.99   HGB 11.9*   HCT 38.9*   MCV 78   MCH 23.8*   MCHC 30.6*   RDW 15.0          Liver Function Studies -   Recent Labs   Lab Test 12/11/19  0639   PROTTOTAL 8.6   ALBUMIN 3.9   BILITOTAL 0.5   ALKPHOS 106   AST 27   ALT 74*       Active Medications  Current Facility-Administered Medications   Medication     acetaminophen (TYLENOL) tablet 650 mg     albuterol (PROAIR HFA/PROVENTIL HFA/VENTOLIN HFA) 108 (90 Base) MCG/ACT inhaler 2 puff     atorvastatin (LIPITOR) tablet 20 mg     glucose gel 15-30 g    Or     dextrose 50 % injection 25-50 mL    Or     glucagon injection 1 mg     divalproex sodium delayed-release (DEPAKOTE) DR tablet 250 mg     [START ON 12/13/2019] glipiZIDE (GLUCOTROL XL) 24 hr tablet 5 mg     hydrALAZINE (APRESOLINE) injection 10-20 mg     insulin aspart (NovoLOG) inj (RAPID ACTING)     insulin aspart (NovoLOG) inj (RAPID ACTING)     insulin glargine (LANTUS PEN) injection 20 Units     labetalol (NORMODYNE/TRANDATE) syringe 10-20 mg     lisinopril (PRINIVIL/ZESTRIL) tablet 10 mg     metFORMIN (GLUCOPHAGE) tablet 1,000 mg     naloxone (NARCAN) injection 0.1-0.4 mg     ondansetron (ZOFRAN-ODT) ODT tab 4 mg    Or     ondansetron (ZOFRAN) injection 4 mg     oxyCODONE (ROXICODONE) tablet 5-10 mg     polyethylene glycol (MIRALAX/GLYCOLAX) Packet 17 g     senna-docusate (SENOKOT-S/PERICOLACE) 8.6-50 MG per tablet 1-2 tablet     Current Outpatient Medications   Medication Sig Dispense Refill     oxyCODONE (ROXICODONE) 5 MG tablet Take 1-2 tablets (5-10 mg) by mouth every 4 hours as needed for moderate to severe pain 50 tablet 0     polyethylene glycol (MIRALAX/GLYCOLAX) packet 17 g by Oral or Feeding Tube route 2 times daily 14 packet 0     senna-docusate (SENOKOT-S/PERICOLACE) 8.6-50 MG tablet Take 1-2 tablets by mouth 2 times daily 30 tablet 0       Current Diet  Orders Placed This Encounter      Regular Diet  Adult      Diet        Assessment  Vanesa Harmon is a 44 year old male with past medical history of hypertension, asthma, fatty liver, GERD, and uncontrolled type 2 diabetes hospitalized after post-coital severe headache with photosensitivity, nausea/vomiting and found to have prepontine, pre medullary cistern subarachnoid hemorrhage.          Plan  - increase glipizide XL to 5 mg daily AM  - continue glargine 20 units at HS (will adjust depending on BG trend with glipizide increase)  - continue metformin 1 g BID  (continue aspart correction scale while inpatient)  - pt counseled on increase activity, diet moderation, follow up PCP 1-2 weeks and consider GLP-1/SGLT-2i (pasted into AVS)  -BG qAC, HS 0200      Brittany HOOD -[phone]    Diabetes Management Team job code: [id number]  I spent a total of 80 minutes bedside and on the inpatient unit managing the glycemic care of Vanesa Harmon. Over 50% of my time on the unit was spent counseling the patient  and/or coordinating care regarding acute and outpatient glucosce management plans.  See note for details.

## 2019-12-12 NOTE — PROGRESS NOTES
"Neurosurgery Progress Note    Subjective:  Increased Lantus to 20 U at night due to ongoing hyperglycemia; given po depakote, headache 4/10    Objective:  /79 (BP Location: Left arm)   Pulse 70   Temp 98.2  F (36.8  C) (Oral)   Resp 16   Ht 1.676 m (5' 6\")   Wt 91.2 kg (201 lb 1.6 oz)   SpO2 98%   BMI 32.46 kg/m    Alert, oriented x3  CN 2-12 intact  5/5 strength in all extremities, no pronator drift  Sensation intact to light touch    Assessment:  Mr. Harmon is a 45 yo M with PMH asthma, HTN, DM2 transferred from [facility] to [facility] after post-coital severe headache, photosensitivity, nausea/vomiting, found to have small prepontine, premedullary cistern subarachnoid hemorrhage, extending into the ventral upper spinal canal. Chen Carson 1, modified Amaya 1. Neurologically intact. CTA head/neck with no aneurysm or vascular malformation identified. Angiogram negative for aneurysm. Ready for discharge home pending headache control.    Plan:  - SBP < 140  - TCD, nimodipine  - regular diet  - sliding scale insulin high dosing; resumed pta glipizide, metformin, increased lantus; consider endocrine consult  - PT: home with outpatient PT  - Discharge home pending headache control, oral depakote    -----------------------------------  Clemente Vidales MD  Neurosurgery Resident PGY2    Please contact neurosurgery resident on call with questions.    Dial * * [phone], enter [phone] when prompted.     "

## 2019-12-12 NOTE — PROGRESS NOTES
Your risk factors for stroke or TIA (transient ischemic attack):    Your Risk Factors Your Results Normal Ranges   High blood pressure BP Readings from Last 1 Encounters:   12/12/19 (!) 144/84    Less than 120/80   Cholesterol              Total Lab Results   Component Value Date    CHOL 122 03/30/2013      Less than 150    Triglycerides   Lab Results   Component Value Date    TRIG 75 03/30/2013    Less than 150   LDL Lab Results   Component Value Date    LDL 84 03/30/2013       Less than 70   HDL Lab Results   Component Value Date    HDL 23 03/30/2013            Greater than 40 (men)  Greater than 50 (women)   Diabetes Recent Labs   Lab 12/12/19  0648   *    Fasting blood glucose    Smoking/tobacco use  Quit smoking and tobacco   Overweight  Lose 1-2 pounds a week   Lack of exercise  30 minutes moderate activity each day   Other risk factors include carotid (neck) artery disease, atrial fibrillation and stress. You may be on new medicine to treat high blood pressure, cholesterol, diabetes or atrial fibrillation.    Understanding Stroke Booklet given to patient. Please refer to booklet for further information.    Stroke warning signs and symptoms - CALL 911 right away for:  - Sudden numbness or weakness in the face, arm or leg (often on one side of the body).  - Sudden confusion or trouble understanding what is going on.  - Sudden blurred or decreased vision in one or both eyes.  - Sudden trouble speaking, loss of balance, dizziness or problems with coordination.  - Sudden, severe headache for no reason.  - Fainting or seizures.  - Symptoms may go away then come back suddenly.    Reviewed with and copy given to patient.

## 2019-12-12 NOTE — PLAN OF CARE
"Pt admitted for severe HA, photosensitivity, and nausea. DX of prepontine, premedullary cistern SAH.  AVSS, N-intact except  HA (HA decreases w/ current TX pt states his HA is about a   4 states  no better or worse better then yesterday  (but he did rate it \"3-6\" yesterday), lungs clr, BS+, VDSP, Reg diet gd po in morning , declined lunch, note endocrine req. BG 2 hrs after his meal  please complete this w/ his next meal, oral glycemic agent  increased,  and 182, SL.  PLAN : Cont. POC, monitor pain control.  "

## 2019-12-13 VITALS
WEIGHT: 201.1 LBS | HEIGHT: 66 IN | OXYGEN SATURATION: 99 % | BODY MASS INDEX: 32.32 KG/M2 | TEMPERATURE: 97.1 F | HEART RATE: 78 BPM | SYSTOLIC BLOOD PRESSURE: 134 MMHG | DIASTOLIC BLOOD PRESSURE: 76 MMHG | RESPIRATION RATE: 16 BRPM

## 2019-12-13 DIAGNOSIS — H05.89 ORBITAL MASS: Primary | ICD-10-CM

## 2019-12-13 LAB
ANION GAP SERPL CALCULATED.3IONS-SCNC: 4 MMOL/L (ref 3–14)
BASOPHILS # BLD AUTO: 0.1 10E9/L (ref 0–0.2)
BASOPHILS NFR BLD AUTO: 0.5 %
BUN SERPL-MCNC: 12 MG/DL (ref 7–30)
CALCIUM SERPL-MCNC: 8.8 MG/DL (ref 8.5–10.1)
CHLORIDE SERPL-SCNC: 104 MMOL/L (ref 94–109)
CO2 SERPL-SCNC: 28 MMOL/L (ref 20–32)
CREAT SERPL-MCNC: 0.76 MG/DL (ref 0.66–1.25)
DIFFERENTIAL METHOD BLD: ABNORMAL
EOSINOPHIL # BLD AUTO: 1 10E9/L (ref 0–0.7)
EOSINOPHIL NFR BLD AUTO: 10 %
ERYTHROCYTE [DISTWIDTH] IN BLOOD BY AUTOMATED COUNT: 14.9 % (ref 10–15)
GFR SERPL CREATININE-BSD FRML MDRD: >90 ML/MIN/{1.73_M2}
GLUCOSE BLDC GLUCOMTR-MCNC: 115 MG/DL (ref 70–99)
GLUCOSE BLDC GLUCOMTR-MCNC: 163 MG/DL (ref 70–99)
GLUCOSE SERPL-MCNC: 113 MG/DL (ref 70–99)
HCT VFR BLD AUTO: 39 % (ref 40–53)
HGB BLD-MCNC: 12.1 G/DL (ref 13.3–17.7)
IMM GRANULOCYTES # BLD: 0 10E9/L (ref 0–0.4)
IMM GRANULOCYTES NFR BLD: 0.3 %
LYMPHOCYTES # BLD AUTO: 3.6 10E9/L (ref 0.8–5.3)
LYMPHOCYTES NFR BLD AUTO: 35.8 %
MAGNESIUM SERPL-MCNC: 2 MG/DL (ref 1.6–2.3)
MCH RBC QN AUTO: 23.8 PG (ref 26.5–33)
MCHC RBC AUTO-ENTMCNC: 31 G/DL (ref 31.5–36.5)
MCV RBC AUTO: 77 FL (ref 78–100)
MONOCYTES # BLD AUTO: 0.9 10E9/L (ref 0–1.3)
MONOCYTES NFR BLD AUTO: 8.5 %
NEUTROPHILS # BLD AUTO: 4.5 10E9/L (ref 1.6–8.3)
NEUTROPHILS NFR BLD AUTO: 44.9 %
NRBC # BLD AUTO: 0 10*3/UL
NRBC BLD AUTO-RTO: 0 /100
PHOSPHATE SERPL-MCNC: 4.4 MG/DL (ref 2.5–4.5)
PLATELET # BLD AUTO: 231 10E9/L (ref 150–450)
POTASSIUM SERPL-SCNC: 4 MMOL/L (ref 3.4–5.3)
RBC # BLD AUTO: 5.08 10E12/L (ref 4.4–5.9)
SODIUM SERPL-SCNC: 136 MMOL/L (ref 133–144)
WBC # BLD AUTO: 10.1 10E9/L (ref 4–11)

## 2019-12-13 PROCEDURE — 36415 COLL VENOUS BLD VENIPUNCTURE: CPT | Performed by: NURSE PRACTITIONER

## 2019-12-13 PROCEDURE — 25000132 ZZH RX MED GY IP 250 OP 250 PS 637: Performed by: STUDENT IN AN ORGANIZED HEALTH CARE EDUCATION/TRAINING PROGRAM

## 2019-12-13 PROCEDURE — 25000132 ZZH RX MED GY IP 250 OP 250 PS 637: Performed by: NURSE PRACTITIONER

## 2019-12-13 PROCEDURE — 25000132 ZZH RX MED GY IP 250 OP 250 PS 637: Performed by: CLINICAL NURSE SPECIALIST

## 2019-12-13 PROCEDURE — 83735 ASSAY OF MAGNESIUM: CPT | Performed by: NURSE PRACTITIONER

## 2019-12-13 PROCEDURE — 85025 COMPLETE CBC W/AUTO DIFF WBC: CPT | Performed by: NURSE PRACTITIONER

## 2019-12-13 PROCEDURE — 84100 ASSAY OF PHOSPHORUS: CPT | Performed by: NURSE PRACTITIONER

## 2019-12-13 PROCEDURE — 80048 BASIC METABOLIC PNL TOTAL CA: CPT | Performed by: NURSE PRACTITIONER

## 2019-12-13 PROCEDURE — 00000146 ZZHCL STATISTIC GLUCOSE BY METER IP

## 2019-12-13 RX ORDER — DIVALPROEX SODIUM 250 MG/1
250 TABLET, DELAYED RELEASE ORAL EVERY 12 HOURS
Qty: 60 TABLET | Refills: 1 | Status: SHIPPED | OUTPATIENT
Start: 2019-12-13

## 2019-12-13 RX ORDER — GLIPIZIDE 5 MG/1
5 TABLET, FILM COATED, EXTENDED RELEASE ORAL DAILY
Qty: 30 TABLET | Refills: 1 | Status: SHIPPED | OUTPATIENT
Start: 2019-12-13

## 2019-12-13 RX ADMIN — OXYCODONE HYDROCHLORIDE 10 MG: 5 TABLET ORAL at 00:40

## 2019-12-13 RX ADMIN — LISINOPRIL 10 MG: 10 TABLET ORAL at 08:49

## 2019-12-13 RX ADMIN — GLIPIZIDE 5 MG: 5 TABLET, FILM COATED, EXTENDED RELEASE ORAL at 08:49

## 2019-12-13 RX ADMIN — ACETAMINOPHEN 650 MG: 325 TABLET, FILM COATED ORAL at 04:11

## 2019-12-13 RX ADMIN — METFORMIN HYDROCHLORIDE 1000 MG: 500 TABLET ORAL at 08:49

## 2019-12-13 RX ADMIN — DIVALPROEX SODIUM 250 MG: 250 TABLET, DELAYED RELEASE ORAL at 08:49

## 2019-12-13 RX ADMIN — OXYCODONE HYDROCHLORIDE 10 MG: 5 TABLET ORAL at 08:49

## 2019-12-13 RX ADMIN — ACETAMINOPHEN 650 MG: 325 TABLET, FILM COATED ORAL at 08:49

## 2019-12-13 RX ADMIN — POLYETHYLENE GLYCOL (3350) 17 G: 17 POWDER, FOR SOLUTION ORAL at 08:49

## 2019-12-13 RX ADMIN — SENNOSIDES AND DOCUSATE SODIUM 2 TABLET: 8.6; 5 TABLET ORAL at 08:49

## 2019-12-13 RX ADMIN — ACETAMINOPHEN 650 MG: 325 TABLET, FILM COATED ORAL at 00:36

## 2019-12-13 RX ADMIN — ATORVASTATIN CALCIUM 20 MG: 20 TABLET, FILM COATED ORAL at 08:49

## 2019-12-13 ASSESSMENT — ACTIVITIES OF DAILY LIVING (ADL)
ADLS_ACUITY_SCORE: 12

## 2019-12-13 NOTE — PROGRESS NOTES
"Neurosurgery Progress Note    Subjective:  Headache 3/10; endocrine - increased glipizide and lantus    Objective:  /81 (BP Location: Left arm)   Pulse 78   Temp 96.3  F (35.7  C) (Oral)   Resp 16   Ht 1.676 m (5' 6\")   Wt 91.2 kg (201 lb 1.6 oz)   SpO2 100%   BMI 32.46 kg/m    Alert, oriented x3  CN 2-12 intact  5/5 strength in all extremities, no pronator drift  Sensation intact to light touch    Assessment:  Mr. Harmon is a 43 yo M with PMH asthma, HTN, DM2 transferred from [facility] to [facility] after post-coital severe headache, photosensitivity, nausea/vomiting, found to have small prepontine, premedullary cistern subarachnoid hemorrhage, extending into the ventral upper spinal canal. Chen Carsno 1, modified Amaya 1. Neurologically intact. CTA head/neck with no aneurysm or vascular malformation identified. Angiogram negative for aneurysm. Ready for discharge home pending headache control.    Plan:  - SBP < 140  - TCD, nimodipine  - regular diet  - per endocrine: increased glipizide and lantus  - PT: home with outpatient PT  - Discharge home pending headache control, oral depakote    -----------------------------------  Clemente Vidales MD  Neurosurgery Resident PGY2    Please contact neurosurgery resident on call with questions.    Dial * * [phone], enter [phone] when prompted.     "

## 2019-12-13 NOTE — PLAN OF CARE
Status: Pt admitted for severe headache, photosensitivity, and nausea. Diagnosis of prepontine, premedullary cistern subarachnoid hemorrhage.  Vitals: VSS on RA.  Neuros: A&O x4. Photophobic headache improving, otherwise intact. Strengths 5/5. Denies N/T.  IV: PIV SL.  Resp/trach: LS clear.  Diet: Regular diet - tolerating well.  Bowel status: No BM this shift. BS+  : Voiding spontaneously  Skin: Intact. Bruising throughout  Pain: HA controlled with oxycodone and scheduled Tylenol.  Activity: Up ad rogerio.  Social: No visitors this shift.  Plan: Plan for discharge tomorrow as long as BG is controlled. Will continue to monitor and follow POC.

## 2019-12-13 NOTE — PLAN OF CARE
Status: Pt admitted with severe headaches and found to have a subarachnoid hemorrhage, per MD note. Cares completed [id number].  Vitals: VSS on RA.  Neuros: A&Ox4. Denies N/T. 5/5 all extremities. Denies photophobia.  IV: PIV SL. Removed per discharge protocol.  Resp/trach: LS clear all fields. Stable on RA.  Diet: Regular.  Bowel status: BS active all quads. No BM reported this shift.  : Voids spontaneously.  Skin: Skin intact.  Pain: Pt c/o headache pain. PRN Oxy and scheduled Tylenol given x 1.  Activity: Up IND in room.  Social: No visitors this shift.  Plan: Discharge orders placed at 1045. RN completed discharge instructions and education. Pt left at 1230 via wheelchair and RN escort with belongings to pharmacy and lobby where sister picked him up to take him home.

## 2019-12-13 NOTE — PLAN OF CARE
Alert and oriented x 4. Neuro's intact. Complained of headache and denies nausea, tylenol and dilaudid given which decreased the headache. Up to the BR independently and voided. Blood glucose was 163.

## 2019-12-13 NOTE — PROGRESS NOTES
Diabetes Consult Daily  Progress Note          Assessment/Plan:     Vanesa Harmon is a 44 year old male with past medical history of hypertension, asthma, fatty liver, GERD, and type 2 diabetes hospitalized after post-coital severe headache with photosensitivity, nausea/vomiting and found to have prepontine, pre medullary cistern subarachnoid hemorrhage.      Glucoses up to 200s last evening, but fasting BG now down to low 100s.  Discharging today with appetite below baseline, anticipate physical activity level will be increasing once home.    Plan for hospital and home:  -glargine 15 units at HS  -glipizide XL 5mg every morning with breakfast  -metformin 1000mg BID  -aspart for correction: high intensity Ac and hs--this can discontinue at discharge.  -check blood sugars before meals and bedtime  -follow up with Dr. Marie at [facility] within 1-2 weeks. At this follow up ask about starting a GLP-1 agonist or SGLT2 inhibitor.       Plan discussed with patient, bedside RN, and primary team.           Interval History:     The last 24 hours progress and nursing notes reviewed.  Vanesa reports that he is feeling better, headache is improved but appetite remains a little below baseline.  He will discharge today.  BG up to 200 last evening, but low 100s this morning.  Anticipate his physical activity level will be increasing once he is home.  We reviewed the changes we have made to his glargine and glipizide, recommend he continue these changes at home.    PTA Regimen:    Before meals BG monitoring frequency  Walks 30 min 3x/week  Unrestricted diet  Metformin 1 gm BID  glargine 11 units at HS  Glipizide XL 2.5 mg around 0400 when starts work  Recent Labs   Lab 12/13/19  0827 12/13/19  0606 12/13/19  0234 12/12/19  2109 12/12/19  1950 12/12/19  1625 12/12/19  1518  12/12/19  0648  12/11/19  0639  12/10/19  0718  12/09/19  0354  12/08/19  0409   GLC  --  113*  --   --   --   --    "--   --  188*  --  226*  --  162*  --  161*  --  208*   *  --  163* 221* 211* 96 103*   < >  --    < >  --    < >  --    < >  --    < >  --     < > = values in this interval not displayed.               Review of Systems:   See interval hx          Medications:     Orders Placed This Encounter      Regular Diet Adult      Diet    Physical Exam:  Gen: Alert, resting in bed, in NAD   HEENT: NC/AT, mucous membranes are moist  Resp: Unlabored  Neuro:oriented x3, communicating clearly  /76   Pulse 78   Temp 97.1  F (36.2  C) (Oral)   Resp 16   Ht 1.676 m (5' 6\")   Wt 91.2 kg (201 lb 1.6 oz)   SpO2 99%   BMI 32.46 kg/m             Data:     Lab Results   Component Value Date    A1C 11.5 12/06/2019    A1C 6.4 06/19/2014              CBC RESULTS:   Recent Labs   Lab Test 12/13/19  0606   WBC 10.1   RBC 5.08   HGB 12.1*   HCT 39.0*   MCV 77*   MCH 23.8*   MCHC 31.0*   RDW 14.9        Recent Labs   Lab Test 12/13/19  0606 12/12/19  0648    137   POTASSIUM 4.0 4.5   CHLORIDE 104 104   CO2 28 29   ANIONGAP 4 4   * 188*   BUN 12 16   CR 0.76 0.75   VEL 8.8 8.9     Liver Function Studies -   Recent Labs   Lab Test 12/11/19  0639   PROTTOTAL 8.6   ALBUMIN 3.9   BILITOTAL 0.5   ALKPHOS 106   AST 27   ALT 74*     Lab Results   Component Value Date    INR 1.15 12/06/2019    INR 0.95 12/05/2019       Karime Bess PA-C [phone]  Diabetes Management job code [id number]            "

## 2019-12-16 ENCOUNTER — PATIENT OUTREACH (OUTPATIENT)
Dept: NEUROSURGERY | Facility: CLINIC | Age: 44
End: 2019-12-16

## 2019-12-16 ENCOUNTER — PATIENT OUTREACH (OUTPATIENT)
Dept: CARE COORDINATION | Facility: CLINIC | Age: 44
End: 2019-12-16

## 2019-12-16 NOTE — PROGRESS NOTES
Patient requesting Oxycodone refill for head pain,  Discharged 12/13/19 for Acute spontaneous subarachnoid intracranial hemorrhage.  Patient states using Oxycodone 5mg 2 tablets every 5 hours for head discomfort. Pt was given quantity 42, has 7 tablets left.   Pain in back of head intermittantly, goes away after taking medication and returns as medication wears off.    Will check with Dr Altamirano about issue checking on refill.    Voices understanding.

## 2019-12-16 NOTE — PROGRESS NOTES
Date: 12/17/2019 Status: Paddy   Time: 2:30 PM Length: 30   Visit Type: [facility] RETURN [[id number]] SANTO:     Provider: Lynda Lopez APRN CNP Department:  NEUROSURGERY     Patient was contacted by an RN for post DC follow up so no duplicate post DC follow up call will be made

## 2019-12-16 NOTE — PROGRESS NOTES
Spoke with patient's wife, states continues to have head pains and using Oxycodone 5mg 2 tablets every 5 hours.    Patient discharged 12/13/19 , states given medication on discharged.    Call to [facility] Discharge pharmacy   Per Franca patient was discharged and signed for Oxycodone 5mg take 1-2 tablets every 4 hours as needed for severe pain, Qty 42 no refills.     Note sent to Kendra Givens NP to see if refill could be ordered or should I contact Dr Altamirano.    Thanks  Shobha

## 2019-12-17 DIAGNOSIS — I60.9 SUBARACHNOID HEMORRHAGE (H): Primary | ICD-10-CM

## 2019-12-17 RX ORDER — OXYCODONE HYDROCHLORIDE 5 MG/1
5 TABLET ORAL EVERY 6 HOURS
Qty: 20 TABLET | Refills: 0 | Status: SHIPPED | OUTPATIENT
Start: 2019-12-17 | End: 2019-12-20

## 2019-12-17 NOTE — PROGRESS NOTES
Per Dr Altamirano can refill Oxycodone 5mg one tablet every 6 hours. Call to wife, will get signed RX or fill at hospital pharmacy.    Voices understanding.

## 2019-12-24 ENCOUNTER — TELEPHONE (OUTPATIENT)
Dept: NEUROSURGERY | Facility: CLINIC | Age: 44
End: 2019-12-24

## 2019-12-24 NOTE — TELEPHONE ENCOUNTER
Callback to patient, leaving messages for patient to return call regarding MRI and medication needed to complete MRI  Please call .

## 2019-12-24 NOTE — TELEPHONE ENCOUNTER
----- Message from SANA Turcios CNP sent at 12/19/2019  2:32 PM CST -----  Regarding: RE: Claustrophobic patient needs order changed  Contact: [phone]  Neil Torres and Shobha Yeager -- could you contact this patient and clarify if he needs the MRI under Anesthesia or just needs a sedative, like Valium? If he needs the MRI with anesthesia, usually this is scheduled with the OR.     Hope this helps!    Thank you!  Isabelle  ----- Message -----  From: Tatyana Ac  Sent: 12/19/2019   1:58 PM CST  To: SANA Turcios CNP  Subject: Claustrophobic patient needs order changed       Taylor,    I am trying to get this patient scheduled for an MRI and he told us that he is claustrophobic and needs to be completely out to be able to complete the MRI.    Can you change the order to include whatever kind of sedation he will need so I can complete the scheduling process?    Thank you!    Blanche

## 2020-01-02 ENCOUNTER — OFFICE VISIT (OUTPATIENT)
Dept: NEUROSURGERY | Facility: CLINIC | Age: 45
End: 2020-01-02
Payer: COMMERCIAL

## 2020-01-02 ENCOUNTER — ANCILLARY PROCEDURE (OUTPATIENT)
Dept: CT IMAGING | Facility: CLINIC | Age: 45
End: 2020-01-02
Attending: NURSE PRACTITIONER
Payer: COMMERCIAL

## 2020-01-02 VITALS
HEART RATE: 88 BPM | RESPIRATION RATE: 16 BRPM | WEIGHT: 197.8 LBS | BODY MASS INDEX: 31.79 KG/M2 | DIASTOLIC BLOOD PRESSURE: 83 MMHG | HEIGHT: 66 IN | OXYGEN SATURATION: 98 % | SYSTOLIC BLOOD PRESSURE: 123 MMHG

## 2020-01-02 DIAGNOSIS — I60.9 SUBARACHNOID HEMORRHAGE (H): Primary | ICD-10-CM

## 2020-01-02 DIAGNOSIS — J06.9 VIRAL UPPER RESPIRATORY TRACT INFECTION: ICD-10-CM

## 2020-01-02 DIAGNOSIS — I60.9 SUBARACHNOID HEMORRHAGE (H): ICD-10-CM

## 2020-01-02 ASSESSMENT — ENCOUNTER SYMPTOMS
SPUTUM PRODUCTION: 1
HALLUCINATIONS: 0
INCREASED ENERGY: 1
SEIZURES: 0
FEVER: 1
POLYDIPSIA: 0
SHORTNESS OF BREATH: 0
POLYPHAGIA: 1
FATIGUE: 0
EYE IRRITATION: 0
HEADACHES: 1
SYNCOPE: 0
LEG PAIN: 0
SNORES LOUDLY: 1
HYPOTENSION: 0
WEIGHT GAIN: 0
POSTURAL DYSPNEA: 0
HYPERTENSION: 0
SLEEP DISTURBANCES DUE TO BREATHING: 0
EXERCISE INTOLERANCE: 0
WEIGHT LOSS: 0
DYSPNEA ON EXERTION: 0
PALPITATIONS: 0
LOSS OF CONSCIOUSNESS: 0
DECREASED APPETITE: 0
NIGHT SWEATS: 1
COUGH: 1
COUGH DISTURBING SLEEP: 0
ORTHOPNEA: 0
TASTE DISTURBANCE: 0
CHILLS: 0
ALTERED TEMPERATURE REGULATION: 0
NECK MASS: 0
LIGHT-HEADEDNESS: 0
HEMOPTYSIS: 0
SMELL DISTURBANCE: 0

## 2020-01-02 ASSESSMENT — MIFFLIN-ST. JEOR: SCORE: 1739.96

## 2020-01-02 ASSESSMENT — PAIN SCALES - GENERAL: PAINLEVEL: NO PAIN (0)

## 2020-01-02 NOTE — LETTER
"1/2/2020       RE: Vanesa Harmon  [address]  [place] [address]     Dear Colleague,    Thank you for referring your patient, Vanesa Harmon, to the [facility] NEUROSURGERY at [facility]. Please see a copy of my visit note below.    [facility]    Neurosurgery Clinic Progress Note      Reason for Visit:     2 Week Hospital Follow Up     History of Presenting Illness:   Mr. Harmon is a 45 yo M with PMH asthma, HTN, DM2 transferred from [facility] to [facility] after post-coital severe headache, photosensitivity, nausea/vomiting, found to have small prepontine, premedullary cistern subarachnoid hemorrhage, extending into the ventral upper spinal canal. Chen Carson 1, modified Amaya 1. Neurologically intact. He underwent CTA head/neck with no aneurysm or vascular malformation identified. Angiogram negative for aneurysm. Cardiac workup negative.  Patient c/o moderate H/A. He was given Decadron 6mg x 1 with improvement of headache.  Will discharge home on Depakote  mg Q12h for headache. Evaluated by PT, tolerating regular diet, pain controlled, and deemed appropriate for discharge home on 12/13/2019.    Today, the patient states that since his discharge he has developed respiratory flu symptoms and cough. His daughter has had cough/cold. His symptoms have been present for approx a week, improving. He continues to c/o HA, not constant.  H/A is significantly improved since discharge.  He will get a slight H/A daily, no longer constant and does not last all day.  He rates the H/A pain ~5-6/10 at worst.  Relieved w/Oxycodone. (takes sparingly). He continues on the Depakote.  He has a dry cough, occas able to expectorate mucous. Sputum is dark yellow, no blood.  No N/V.  He has been taking DayQuil/NyQuil.  He felt \"feverish\" but did not formally measure his temperature.  He has not felt like he has a fever for the past 2 days. He c/o chest " pain, however, upon further inquiry, he describes MSK-type pain assoc w/Cough.  No wheeze, SOB, or difficulty breathing.  He was supposed to return to work at this time.        Current Outpatient Medications:      albuterol (PROAIR HFA/PROVENTIL HFA/VENTOLIN HFA) 108 (90 Base) MCG/ACT inhaler, Inhale 2 puffs into the lungs 4 times daily as needed for shortness of breath / dyspnea or wheezing, Disp: , Rfl:      atorvastatin (LIPITOR) 20 MG tablet, Take 20 mg by mouth daily, Disp: , Rfl:      divalproex sodium delayed-release (DEPAKOTE) 250 MG DR tablet, Take 1 tablet (250 mg) by mouth every 12 hours, Disp: 60 tablet, Rfl: 1     glipiZIDE (GLUCOTROL XL) 5 MG 24 hr tablet, Take 1 tablet (5 mg) by mouth daily, Disp: 30 tablet, Rfl: 1     insulin glargine (LANTUS PEN) 100 UNIT/ML pen, Inject 15 Units Subcutaneous every evening, Disp: 6 mL, Rfl: 1     lisinopril (PRINIVIL/ZESTRIL) 10 MG tablet, Take 1 tablet by mouth daily, Disp: , Rfl:      metFORMIN (GLUCOPHAGE) 1000 MG tablet, Take 1 tablet by mouth 2 times daily (with meals), Disp: , Rfl:      polyethylene glycol (MIRALAX/GLYCOLAX) packet, 17 g by Oral or Feeding Tube route 2 times daily, Disp: 14 packet, Rfl: 0     senna-docusate (SENOKOT-S/PERICOLACE) 8.6-50 MG tablet, Take 1-2 tablets by mouth 2 times daily, Disp: 30 tablet, Rfl: 0      Answers for HPI/ROS submitted by the patient on 1/2/2020   General Symptoms: Yes  Skin Symptoms: No  HENT Symptoms: Yes  EYE SYMPTOMS: Yes                      HEART SYMPTOMS: Yes   LUNG SYMPTOMS: Yes  INTESTINAL SYMPTOMS: No  URINARY SYMPTOMS: No  REPRODUCTIVE SYMPTOMS: No  SKELETAL SYMPTOMS: No  BLOOD SYMPTOMS: No  NERVOUS SYSTEM SYMPTOMS: Yes  MENTAL HEALTH SYMPTOMS: No  Fever: Yes  Loss of appetite: No  Weight loss: No  Weight gain: No  Fatigue: No  Night sweats: Yes  Chills: No  Increased stress: No  Excessive hunger:   No  Excessive thirst: No  Feeling hot or cold when others believe the temperature is normal: No  Loss of  "height: No  Post-operative complications: No  Surgical site pain: No  Hallucinations: No  Change in or Loss of Energy: Yes  Hyperactivity: No  Confusion: Yes  Ear pain: No  Ear discharge: No  Gum tenderness: No  Bleeding gums: No  Change in taste: No  Change in sense of smell: No  Dry mouth: Yes  Hearing aid used: No  Neck lump: No  Tunnel Vision: No  Yellowing of eyes: No  Eye irritation: No  Cough: Yes  Sputum or phlegm: Yes  Coughing up blood: No  Difficulty breating or shortness of breath: No  Snoring: Yes  Difficulty breathing on exertion: No  Nighttime Cough: No  Difficulty breathing when lying flat: No  Chest pain or pressure: Yes  Fast or irregular heartbeat: No  Pain in legs with walking: No  Trouble breathing while lying down: No  Fingers or toes appear blue: No  High blood pressure: No  Low blood pressure: No  Fainting: No  Murmurs: No  Pacemaker: No  Varicose veins: No  Edema or swelling: No  Wake up at night with shortness of breath: No  Light-headedness: No  Exercise intolerance: No  Fainting or black-out spells: No  Headache: Yes  Seizures: No    Reviewed patient answers w/patient. Pain/symptom complaints consistent w/URI,   and H/A assoc with ICH.     Examination:   /83   Pulse 88   Resp 16   Ht 1.676 m (5' 6\")   Wt 89.7 kg (197 lb 12.8 oz)   SpO2 98%   BMI 31.93 kg/m     Temp  97.9 F    Neurological Assessment:     General Appearance: Awake; Well-Nourished; Pleasant; In mild distress.  Wearing facial mask.   Mental Status: Alert and Oriented to Person, Place, Time and Situation  Speech: Fluent; No aphasia or dysarthria  Pulmonary:   Noted dry cough on exam today.  Lungs without crackles, rales, wheeze  Cranial Nerves:   Pupils Equal and Reactive to Light and Accommodation  Extra-Ocular Movements Intact  Visual Fields Intact  Facial Movements Symmetric  Facial Sensation Intact to Light Touch in the V1-V3 distributions bilaterally  Tongue Protrusion Midline  Shoulder Shrug " 5/5    Motor:  Upper Extremities:     C5 (Deltoid) C5/6 (Bicep) C7 (Tricep) C8 (Finger Flexion) T1 (Interosseous)   Right 5/5 5/5 5/5 5/5 5/5   Left 5/5 5/5 5/5 5/5 5/5     Lower Extremities:   L2 (Hip Flexion) L3 (Knee Extension)  L4 (Foot Dorsiflexion) L5 (EHL Extesnion) S1 (Foot Plantar Extension)   Right 5/5 5/5 5/5 5/5 5/5   Left 5/5 5/5 5/5 5/5 5/5     Sensory: Intact to Light Touch in Bilateral Upper and Lower Extremities     IMAGING:  CT HEAD W/O CONTRAST 1/2/2020 7:07 AM     Comparison: CT head 12/9/2019, 12/5/2019.     Findings:    Interval resolution of small amount of subarachnoid hemorrhage in the  prepontine cistern. No new intracranial hemorrhage. No mass effect or  midline shift. The ventricles are proportionate to the cerebral sulci.  No hydrocephalus. The gray to white matter differentiation of the  cerebral hemispheres is preserved.      Inflammatory mucosal thickening of the bilateral maxillary sinuses,  right more than left ethmoid air cells, and sphenoid sinus. The  mastoid air cells are clear. Unchanged appearance of the right  supraorbital rim fibro-osseous lesion.                                                          Impression:   1. Interval resolution of small volume subarachnoid hemorrhage in the  prepontine cistern. No new intracranial hemorrhage or delayed onset  hydrocephalus.  2. 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      KENNETH APODACA MD    I have personally reviewed the examination and initial interpretation  and I agree with the findings.      Assessment:  1. 44 yr old male s/p post-coital prepontine, premedullary cistern subarachnoid hemorrhage    2. 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 URI     Plan:    Low suspicion for PE/pneunomia, however will have patient seen by PC this morning.   ~Appointment made with primary care at [facility] this morning with Dr. Anguiano at 9:30 am     to  undergo workup for URI symptoms, CXR.   ~Letter to remain out of work for 2 weeks.  Due to heavy physical demands of his job, will continue w/no heavy     lifting or significant bending w/lifting restrictions for 4-6 weeks.   ~Return to work form completed    ~Continue on Depakote for H/A, can taper to once daily for a week and then discontinue  ~Follow-up with Dr. Altamirano in 6 months in Neurosurgery clinic with repeat MRI brain and orbit w/wo contrast     SANA Marie, DNP  Nurse Practitioner  Department of Neurosurgery   [phone]

## 2020-01-02 NOTE — LETTER
January 2, 2020      RE: Vanesa Harmon  [address]  [place][1] [id number]       To whom it may concern:    Vanesa Harmon was seen in our clinic today. He is seen following hospitalization for intracranial hemorrhage.     At this time, he will remain out of work for 2 weeks.        Sincerely,          Cathleen Hoyt DNP  Neurosurgery Nurse Practitioner  [facility]  [phone]

## 2020-01-02 NOTE — NURSING NOTE
Chief Complaint   Patient presents with     RECHECK     [facility] RETURN F/U SUBARACHNOID HEMORRHAGE       Mac Wilhelm, EMT

## 2020-03-13 ENCOUNTER — TELEPHONE (OUTPATIENT)
Dept: NEUROSURGERY | Facility: CLINIC | Age: 45
End: 2020-03-13

## 2020-03-13 NOTE — TELEPHONE ENCOUNTER
[facility] Call Center    Phone Message    May a detailed message be left on voicemail: yes     Reason for Call: Other: Pt needing back to work letter faxed to his employer today, at [phone]     Action Taken: Message routed to:  [facility] ([facility] neurosurg    Travel Screening: Not Applicable

## 2020-03-16 ENCOUNTER — CARE COORDINATION (OUTPATIENT)
Dept: NEUROSURGERY | Facility: CLINIC | Age: 45
End: 2020-03-16

## 2020-03-16 NOTE — LETTER
March 13, 2020     RE:  Vanesa Harmon                                                                                                                                                       [id number]  [place] [address]                 To whom it may concern:     Vanesa Harmon is under my professional care following intracranial hemorrhage.      At this time, he is able to return to work without restrictions.      Sincerely,           Cathleen Hoyt DNP  Neurosurgery Nurse Practitioner  [facility]  [phone]

## 2025-04-23 NOTE — PROGRESS NOTES
"[facility]    Neurosurgery Clinic Progress Note      Reason for Visit:     2 Week Hospital Follow Up     History of Presenting Illness:   Mr. Harmon is a 43 yo M with PMH asthma, HTN, DM2 transferred from [facility] to [facility] after post-coital severe headache, photosensitivity, nausea/vomiting, found to have small prepontine, premedullary cistern subarachnoid hemorrhage, extending into the ventral upper spinal canal. Chen Carson 1, modified Amaya 1. Neurologically intact. He underwent CTA head/neck with no aneurysm or vascular malformation identified. Angiogram negative for aneurysm. Cardiac workup negative.  Patient c/o moderate H/A. He was given Decadron 6mg x 1 with improvement of headache.  Will discharge home on Depakote  mg Q12h for headache. Evaluated by PT, tolerating regular diet, pain controlled, and deemed appropriate for discharge home on 12/13/2019.    Today, the patient states that since his discharge he has developed respiratory flu symptoms and cough. His daughter has had cough/cold. His symptoms have been present for approx a week, improving. He continues to c/o HA, not constant.  H/A is significantly improved since discharge.  He will get a slight H/A daily, no longer constant and does not last all day.  He rates the H/A pain ~5-6/10 at worst.  Relieved w/Oxycodone. (takes sparingly). He continues on the Depakote.  He has a dry cough, occas able to expectorate mucous. Sputum is dark yellow, no blood.  No N/V.  He has been taking DayQuil/NyQuil.  He felt \"feverish\" but did not formally measure his temperature.  He has not felt like he has a fever for the past 2 days. He c/o chest pain, however, upon further inquiry, he describes MSK-type pain assoc w/Cough.  No wheeze, SOB, or difficulty breathing.  He was supposed to return to work at this time.        Current Outpatient Medications:      albuterol (PROAIR HFA/PROVENTIL HFA/VENTOLIN HFA) 108 (90 Base) MCG/ACT " inhaler, Inhale 2 puffs into the lungs 4 times daily as needed for shortness of breath / dyspnea or wheezing, Disp: , Rfl:      atorvastatin (LIPITOR) 20 MG tablet, Take 20 mg by mouth daily, Disp: , Rfl:      divalproex sodium delayed-release (DEPAKOTE) 250 MG DR tablet, Take 1 tablet (250 mg) by mouth every 12 hours, Disp: 60 tablet, Rfl: 1     glipiZIDE (GLUCOTROL XL) 5 MG 24 hr tablet, Take 1 tablet (5 mg) by mouth daily, Disp: 30 tablet, Rfl: 1     insulin glargine (LANTUS PEN) 100 UNIT/ML pen, Inject 15 Units Subcutaneous every evening, Disp: 6 mL, Rfl: 1     lisinopril (PRINIVIL/ZESTRIL) 10 MG tablet, Take 1 tablet by mouth daily, Disp: , Rfl:      metFORMIN (GLUCOPHAGE) 1000 MG tablet, Take 1 tablet by mouth 2 times daily (with meals), Disp: , Rfl:      polyethylene glycol (MIRALAX/GLYCOLAX) packet, 17 g by Oral or Feeding Tube route 2 times daily, Disp: 14 packet, Rfl: 0     senna-docusate (SENOKOT-S/PERICOLACE) 8.6-50 MG tablet, Take 1-2 tablets by mouth 2 times daily, Disp: 30 tablet, Rfl: 0      Answers for HPI/ROS submitted by the patient on 1/2/2020   General Symptoms: Yes  Skin Symptoms: No  HENT Symptoms: Yes  EYE SYMPTOMS: Yes                      HEART SYMPTOMS: Yes   LUNG SYMPTOMS: Yes  INTESTINAL SYMPTOMS: No  URINARY SYMPTOMS: No  REPRODUCTIVE SYMPTOMS: No  SKELETAL SYMPTOMS: No  BLOOD SYMPTOMS: No  NERVOUS SYSTEM SYMPTOMS: Yes  MENTAL HEALTH SYMPTOMS: No  Fever: Yes  Loss of appetite: No  Weight loss: No  Weight gain: No  Fatigue: No  Night sweats: Yes  Chills: No  Increased stress: No  Excessive hunger:   No  Excessive thirst: No  Feeling hot or cold when others believe the temperature is normal: No  Loss of height: No  Post-operative complications: No  Surgical site pain: No  Hallucinations: No  Change in or Loss of Energy: Yes  Hyperactivity: No  Confusion: Yes  Ear pain: No  Ear discharge: No  Gum tenderness: No  Bleeding gums: No  Change in taste: No  Change in sense of smell: No  Dry  "mouth: Yes  Hearing aid used: No  Neck lump: No  Tunnel Vision: No  Yellowing of eyes: No  Eye irritation: No  Cough: Yes  Sputum or phlegm: Yes  Coughing up blood: No  Difficulty breating or shortness of breath: No  Snoring: Yes  Difficulty breathing on exertion: No  Nighttime Cough: No  Difficulty breathing when lying flat: No  Chest pain or pressure: Yes  Fast or irregular heartbeat: No  Pain in legs with walking: No  Trouble breathing while lying down: No  Fingers or toes appear blue: No  High blood pressure: No  Low blood pressure: No  Fainting: No  Murmurs: No  Pacemaker: No  Varicose veins: No  Edema or swelling: No  Wake up at night with shortness of breath: No  Light-headedness: No  Exercise intolerance: No  Fainting or black-out spells: No  Headache: Yes  Seizures: No    Reviewed patient answers w/patient. Pain/symptom complaints consistent w/URI,   and H/A assoc with ICH.     Examination:   /83   Pulse 88   Resp 16   Ht 1.676 m (5' 6\")   Wt 89.7 kg (197 lb 12.8 oz)   SpO2 98%   BMI 31.93 kg/m    Temp  97.9 F    Neurological Assessment:     General Appearance: Awake; Well-Nourished; Pleasant; In mild distress.  Wearing facial mask.   Mental Status: Alert and Oriented to Person, Place, Time and Situation  Speech: Fluent; No aphasia or dysarthria  Pulmonary:   Noted dry cough on exam today.  Lungs without crackles, rales, wheeze  Cranial Nerves:   Pupils Equal and Reactive to Light and Accommodation  Extra-Ocular Movements Intact  Visual Fields Intact  Facial Movements Symmetric  Facial Sensation Intact to Light Touch in the V1-V3 distributions bilaterally  Tongue Protrusion Midline  Shoulder Shrug 5/5    Motor:  Upper Extremities:     C5 (Deltoid) C5/6 (Bicep) C7 (Tricep) C8 (Finger Flexion) T1 (Interosseous)   Right 5/5 5/5 5/5 5/5 5/5   Left 5/5 5/5 5/5 5/5 5/5     Lower Extremities:   L2 (Hip Flexion) L3 (Knee Extension)  L4 (Foot Dorsiflexion) L5 (EHL Extesnion) S1 (Foot Plantar Extension) " 97.4   Right 5/5 5/5 5/5 5/5 5/5   Left 5/5 5/5 5/5 5/5 5/5     Sensory: Intact to Light Touch in Bilateral Upper and Lower Extremities     IMAGING:  CT HEAD W/O CONTRAST 1/2/2020 7:07 AM     Comparison: CT head 12/9/2019, 12/5/2019.     Findings:    Interval resolution of small amount of subarachnoid hemorrhage in the  prepontine cistern. No new intracranial hemorrhage. No mass effect or  midline shift. The ventricles are proportionate to the cerebral sulci.  No hydrocephalus. The gray to white matter differentiation of the  cerebral hemispheres is preserved.      Inflammatory mucosal thickening of the bilateral maxillary sinuses,  right more than left ethmoid air cells, and sphenoid sinus. The  mastoid air cells are clear. Unchanged appearance of the right  supraorbital rim fibro-osseous lesion.                                                          Impression:   1. Interval resolution of small volume subarachnoid hemorrhage in the  prepontine cistern. No new intracranial hemorrhage or delayed onset  hydrocephalus.  2. 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      KENNETH APODACA MD    I have personally reviewed the examination and initial interpretation  and I agree with the findings.      Assessment:  1. 44 yr old male s/p post-coital prepontine, premedullary cistern subarachnoid hemorrhage    2. 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 URI     Plan:    Low suspicion for PE/pneunomia, however will have patient seen by PC this morning.   ~Appointment made with primary care at [facility] this morning with Dr. Anguiano at 9:30 am     to undergo workup for URI symptoms, CXR.   ~Letter to remain out of work for 2 weeks.  Due to heavy physical demands of his job, will continue w/no heavy     lifting or significant bending w/lifting restrictions for 4-6 weeks.   ~Return to work form completed    ~Continue on Depakote for H/A, can taper  to once daily for a week and then discontinue  ~Follow-up with Dr. Altamirano in 6 months in Neurosurgery clinic with repeat MRI brain and orbit w/wo contrast       SANA Marie, DNP  Nurse Practitioner  Department of Neurosurgery   [phone]

## (undated) RX ORDER — ONDANSETRON 2 MG/ML
INJECTION INTRAMUSCULAR; INTRAVENOUS
Status: DISPENSED
Start: 2019-12-06

## (undated) RX ORDER — FENTANYL CITRATE 50 UG/ML
INJECTION, SOLUTION INTRAMUSCULAR; INTRAVENOUS
Status: DISPENSED
Start: 2019-12-06

## (undated) RX ORDER — LIDOCAINE HYDROCHLORIDE 10 MG/ML
INJECTION, SOLUTION EPIDURAL; INFILTRATION; INTRACAUDAL; PERINEURAL
Status: DISPENSED
Start: 2019-12-06

## (undated) RX ORDER — HEPARIN SODIUM 1000 [USP'U]/ML
INJECTION, SOLUTION INTRAVENOUS; SUBCUTANEOUS
Status: DISPENSED
Start: 2019-12-06